# Patient Record
Sex: FEMALE | Race: WHITE | NOT HISPANIC OR LATINO | Employment: FULL TIME | URBAN - METROPOLITAN AREA
[De-identification: names, ages, dates, MRNs, and addresses within clinical notes are randomized per-mention and may not be internally consistent; named-entity substitution may affect disease eponyms.]

---

## 2023-05-19 DIAGNOSIS — F41.9 ANXIETY AND DEPRESSION: ICD-10-CM

## 2023-05-19 DIAGNOSIS — F41.1 ANXIETY, GENERALIZED: ICD-10-CM

## 2023-05-19 DIAGNOSIS — F32.A ANXIETY AND DEPRESSION: ICD-10-CM

## 2023-05-19 PROBLEM — N32.81 OAB (OVERACTIVE BLADDER): Status: ACTIVE | Noted: 2023-05-19

## 2023-05-19 PROBLEM — Z15.09 BRCA2 POSITIVE: Status: ACTIVE | Noted: 2023-05-19

## 2023-05-19 PROBLEM — R29.818 SUSPECTED SLEEP APNEA: Status: ACTIVE | Noted: 2023-05-19

## 2023-05-19 PROBLEM — K29.70 GASTRITIS: Status: ACTIVE | Noted: 2023-05-19

## 2023-05-19 PROBLEM — E88.810 DYSMETABOLIC SYNDROME X: Status: ACTIVE | Noted: 2023-05-19

## 2023-05-19 PROBLEM — E66.9 OBESITY: Status: ACTIVE | Noted: 2023-05-19

## 2023-05-19 PROBLEM — Z98.84 BARIATRIC SURGERY STATUS: Status: ACTIVE | Noted: 2023-05-19

## 2023-05-19 PROBLEM — G43.909 MIGRAINE HEADACHE: Status: ACTIVE | Noted: 2023-05-19

## 2023-05-19 PROBLEM — Z15.01 BRCA2 POSITIVE: Status: ACTIVE | Noted: 2023-05-19

## 2023-05-19 PROBLEM — G47.33 OBSTRUCTIVE SLEEP APNEA: Status: ACTIVE | Noted: 2023-05-19

## 2023-05-19 PROBLEM — R53.83 FATIGUE: Status: ACTIVE | Noted: 2023-05-19

## 2023-05-19 PROBLEM — R06.83 SNORING: Status: ACTIVE | Noted: 2023-05-19

## 2023-05-19 PROBLEM — R73.03 PREDIABETES: Status: ACTIVE | Noted: 2023-05-19

## 2023-05-19 PROBLEM — N92.1 MENORRHAGIA WITH IRREGULAR CYCLE: Status: ACTIVE | Noted: 2023-05-19

## 2023-05-19 PROBLEM — Z90.49 S/P CHOLECYSTECTOMY: Status: ACTIVE | Noted: 2023-05-19

## 2023-05-19 PROBLEM — R40.0 DAYTIME SLEEPINESS: Status: ACTIVE | Noted: 2023-05-19

## 2023-05-19 PROBLEM — N39.41 URGE INCONTINENCE OF URINE: Status: ACTIVE | Noted: 2023-05-19

## 2023-05-19 PROBLEM — K91.2 MALNUTRITION FOLLOWING GASTROINTESTINAL SURGERY (HHS-HCC): Status: ACTIVE | Noted: 2023-05-19

## 2023-05-19 PROBLEM — I10 BENIGN HYPERTENSION: Status: ACTIVE | Noted: 2023-05-19

## 2023-05-19 RX ORDER — VENLAFAXINE HYDROCHLORIDE 150 MG/1
150 CAPSULE, EXTENDED RELEASE ORAL DAILY
Qty: 30 CAPSULE | Refills: 0 | Status: SHIPPED | OUTPATIENT
Start: 2023-05-19 | End: 2023-06-23

## 2023-05-19 RX ORDER — VENLAFAXINE HYDROCHLORIDE 150 MG/1
1 CAPSULE, EXTENDED RELEASE ORAL DAILY
COMMUNITY
Start: 2014-05-21 | End: 2023-05-19 | Stop reason: SDUPTHER

## 2023-05-19 RX ORDER — BUSPIRONE HYDROCHLORIDE 10 MG/1
10 TABLET ORAL 3 TIMES DAILY
COMMUNITY
Start: 2023-02-01 | End: 2023-07-24 | Stop reason: SDUPTHER

## 2023-06-15 DIAGNOSIS — F41.1 ANXIETY, GENERALIZED: ICD-10-CM

## 2023-06-22 NOTE — TELEPHONE ENCOUNTER
Patient needs refill of Venlafaxine 150 mg called into [facility] on [facility].  Patient is completely out.  Last OV 11/18/2022, next OV 7/28/2023.

## 2023-06-23 RX ORDER — VENLAFAXINE HYDROCHLORIDE 150 MG/1
150 CAPSULE, EXTENDED RELEASE ORAL DAILY
Qty: 30 CAPSULE | Refills: 0 | Status: SHIPPED | OUTPATIENT
Start: 2023-06-23 | End: 2023-07-24 | Stop reason: SDUPTHER

## 2023-07-24 ENCOUNTER — OFFICE VISIT (OUTPATIENT)
Dept: PRIMARY CARE | Facility: CLINIC | Age: 48
End: 2023-07-24
Payer: COMMERCIAL

## 2023-07-24 VITALS
SYSTOLIC BLOOD PRESSURE: 120 MMHG | HEIGHT: 68 IN | DIASTOLIC BLOOD PRESSURE: 80 MMHG | BODY MASS INDEX: 35.01 KG/M2 | OXYGEN SATURATION: 97 % | WEIGHT: 231 LBS | HEART RATE: 78 BPM

## 2023-07-24 DIAGNOSIS — F41.9 ANXIETY AND DEPRESSION: ICD-10-CM

## 2023-07-24 DIAGNOSIS — K91.2 MALNUTRITION FOLLOWING GASTROINTESTINAL SURGERY (HHS-HCC): ICD-10-CM

## 2023-07-24 DIAGNOSIS — F32.A ANXIETY AND DEPRESSION: ICD-10-CM

## 2023-07-24 DIAGNOSIS — Z00.00 ANNUAL PHYSICAL EXAM: Primary | ICD-10-CM

## 2023-07-24 DIAGNOSIS — E55.9 VITAMIN D DEFICIENCY: ICD-10-CM

## 2023-07-24 DIAGNOSIS — Z12.11 COLON CANCER SCREENING: ICD-10-CM

## 2023-07-24 DIAGNOSIS — E66.09 CLASS 2 OBESITY DUE TO EXCESS CALORIES WITH BODY MASS INDEX (BMI) OF 35.0 TO 35.9 IN ADULT, UNSPECIFIED WHETHER SERIOUS COMORBIDITY PRESENT: ICD-10-CM

## 2023-07-24 DIAGNOSIS — F41.1 ANXIETY, GENERALIZED: ICD-10-CM

## 2023-07-24 DIAGNOSIS — R73.03 PREDIABETES: ICD-10-CM

## 2023-07-24 PROCEDURE — 3008F BODY MASS INDEX DOCD: CPT | Performed by: FAMILY MEDICINE

## 2023-07-24 PROCEDURE — 1036F TOBACCO NON-USER: CPT | Performed by: FAMILY MEDICINE

## 2023-07-24 PROCEDURE — 3079F DIAST BP 80-89 MM HG: CPT | Performed by: FAMILY MEDICINE

## 2023-07-24 PROCEDURE — 99396 PREV VISIT EST AGE 40-64: CPT | Performed by: FAMILY MEDICINE

## 2023-07-24 PROCEDURE — 3074F SYST BP LT 130 MM HG: CPT | Performed by: FAMILY MEDICINE

## 2023-07-24 RX ORDER — BUSPIRONE HYDROCHLORIDE 10 MG/1
10 TABLET ORAL 3 TIMES DAILY
Qty: 90 TABLET | Refills: 11 | Status: SHIPPED | OUTPATIENT
Start: 2023-07-24 | End: 2024-07-23

## 2023-07-24 RX ORDER — VENLAFAXINE HYDROCHLORIDE 150 MG/1
150 CAPSULE, EXTENDED RELEASE ORAL DAILY
Qty: 90 CAPSULE | Refills: 3 | Status: SHIPPED | OUTPATIENT
Start: 2023-07-24

## 2023-07-24 ASSESSMENT — PATIENT HEALTH QUESTIONNAIRE - PHQ9
SUM OF ALL RESPONSES TO PHQ9 QUESTIONS 1 AND 2: 0
9. THOUGHTS THAT YOU WOULD BE BETTER OFF DEAD, OR OF HURTING YOURSELF: NOT AT ALL
10. IF YOU CHECKED OFF ANY PROBLEMS, HOW DIFFICULT HAVE THESE PROBLEMS MADE IT FOR YOU TO DO YOUR WORK, TAKE CARE OF THINGS AT HOME, OR GET ALONG WITH OTHER PEOPLE: NOT DIFFICULT AT ALL
1. LITTLE INTEREST OR PLEASURE IN DOING THINGS: NOT AT ALL
8. MOVING OR SPEAKING SO SLOWLY THAT OTHER PEOPLE COULD HAVE NOTICED. OR THE OPPOSITE, BEING SO FIGETY OR RESTLESS THAT YOU HAVE BEEN MOVING AROUND A LOT MORE THAN USUAL: NOT AT ALL
4. FEELING TIRED OR HAVING LITTLE ENERGY: MORE THAN HALF THE DAYS
7. TROUBLE CONCENTRATING ON THINGS, SUCH AS READING THE NEWSPAPER OR WATCHING TELEVISION: NOT AT ALL
5. POOR APPETITE OR OVEREATING: MORE THAN HALF THE DAYS
6. FEELING BAD ABOUT YOURSELF - OR THAT YOU ARE A FAILURE OR HAVE LET YOURSELF OR YOUR FAMILY DOWN: MORE THAN HALF THE DAYS
3. TROUBLE FALLING OR STAYING ASLEEP OR SLEEPING TOO MUCH: SEVERAL DAYS
SUM OF ALL RESPONSES TO PHQ QUESTIONS 1-9: 7
2. FEELING DOWN, DEPRESSED OR HOPELESS: NOT AT ALL

## 2023-07-24 ASSESSMENT — ANXIETY QUESTIONNAIRES
7. FEELING AFRAID AS IF SOMETHING AWFUL MIGHT HAPPEN: SEVERAL DAYS
1. FEELING NERVOUS, ANXIOUS, OR ON EDGE: SEVERAL DAYS
2. NOT BEING ABLE TO STOP OR CONTROL WORRYING: NOT AT ALL
IF YOU CHECKED OFF ANY PROBLEMS ON THIS QUESTIONNAIRE, HOW DIFFICULT HAVE THESE PROBLEMS MADE IT FOR YOU TO DO YOUR WORK, TAKE CARE OF THINGS AT HOME, OR GET ALONG WITH OTHER PEOPLE: NOT DIFFICULT AT ALL
4. TROUBLE RELAXING: NOT AT ALL
6. BECOMING EASILY ANNOYED OR IRRITABLE: NOT AT ALL
3. WORRYING TOO MUCH ABOUT DIFFERENT THINGS: NOT AT ALL

## 2023-07-24 ASSESSMENT — ENCOUNTER SYMPTOMS
OCCASIONAL FEELINGS OF UNSTEADINESS: 0
DEPRESSION: 0
LOSS OF SENSATION IN FEET: 0

## 2023-07-24 NOTE — PROGRESS NOTES
"Subjective   Mercedes Burnham is a 48 y.o. female and is here for a comprehensive physical exam and follow up on the following concerns:    ANXIETY & DEPRESSION: moods stable on Venlafaxine    OBESITY: s/p bariatric procedure  Gradually has regained weight over the last few years    Intermittent low glucose - feeling shaky and then check s glucose and levels can be down to 60-70  No triggers that she can identify  Will brennon to drink protein shake or elli milk which does help usually    GEMA: stopped CPAP and feeling fairly well rested    Do you take any herbs or supplements that were not prescribed by a doctor? no  Are you taking calcium supplements? no  Are you taking aspirin daily? no    SOC Hx:   Tobacco Use: Low Risk  (2023)    Patient History     Smoking Tobacco Use: Never     Smokeless Tobacco Use: Never     Passive Exposure: Not on file     Alcohol Use: Not on file     DIET: regular, well rounded - less protein than what she considers normal  Vitamin B complex and Vit B 12    EXERCISE: nothing regularly    ELIMINATION: no constipation or diarrhea    No urinary issues    SLEEP: no issues for the most part - still somewhat fatigued during the day  Rare difficulty falling asleep    MOODS: moods manageable - no concerning new symptoms  PHQ-9: Patient Health Questionnaire-9 Score: 0    MICHELLE-7: MICHELLE-7 Total Score: 0     OB/GYN:   No known family history of breast cancer.  Menstrual cycles - stopped after endometrial  ablation 2 years ago      History:  LMP: No LMP recorded.  MAMMO:  2022  Last pap date: utd  Abnormal pap? no  : 1  Para: 1    Review of Systems   Review of Systems negative except as noted in HPI and Chief complaint.     Objective     /80 (BP Location: Left arm, Patient Position: Sitting, BP Cuff Size: Adult)   Pulse 78   Ht 1.715 m (5' 7.5\")   Wt 105 kg (231 lb)   SpO2 97%   BMI 35.65 kg/m²      Physical Exam  Constitutional:       General: She is not in acute distress.   "   Appearance: Normal appearance.   HENT:      Head: Normocephalic and atraumatic.      Right Ear: Tympanic membrane and ear canal normal.      Left Ear: Tympanic membrane and ear canal normal.      Mouth/Throat:      Mouth: Mucous membranes are moist.   Eyes:      Conjunctiva/sclera: Conjunctivae normal.      Pupils: Pupils are equal, round, and reactive to light.   Neck:      Vascular: No carotid bruit.   Cardiovascular:      Rate and Rhythm: Normal rate and regular rhythm.      Heart sounds: No murmur heard.  Pulmonary:      Effort: Pulmonary effort is normal.      Breath sounds: Normal breath sounds. No wheezing or rhonchi.   Abdominal:      General: Bowel sounds are normal.      Palpations: Abdomen is soft.   Musculoskeletal:         General: No swelling.      Cervical back: No rigidity.   Lymphadenopathy:      Cervical: No cervical adenopathy.   Skin:     General: Skin is warm and dry.      Findings: No rash.   Neurological:      Mental Status: She is alert.         Assessment/Plan   Healthy female exam.      1. Please have labs drawn at your earliest convenience.  You should fast 12 hours prior to arriving at the lab - during these 12 hours you may have water, black coffee, black tea - nothing with cream or sugar and no solid foods.    Our office will contact you with results after they have been reviewed.  Please allow 48 - 72 hours for most results, some may take longer.  Please keep in mind that results may post immediately to your online portal, even before we have a chance to review them.  Once we have had the opportunity to review we will post comments and have our staff contact you with results and any instructions.  Please call our office if you do not hear from our office in 7 days.     2. Patient Counseling:  --Nutrition: Stressed importance of moderation in sodium/caffeine intake, saturated fat and cholesterol, caloric balance, sufficient intake of fresh fruits, vegetables, fiber, calcium, iron, and  1 mg of folate supplement per day (for females capable of pregnancy).  --Discussed the issue of estrogen replacement, calcium supplement, and the daily use of baby aspirin.  --Exercise: Stressed the importance of regular exercise.   --Substance Abuse: Discussed cessation/primary prevention of tobacco, alcohol, or other drug use; driving or other dangerous activities under the influence; availability of treatment for abuse.    --Sexuality: Discussed sexually transmitted diseases, partner selection, use of condoms, avoidance of unintended pregnancy  and contraceptive alternatives.   --Injury prevention: Discussed safety belts, safety helmets, smoke detector, smoking near bedding or upholstery.   --Dental health: Discussed importance of regular tooth brushing, flossing, and dental visits.  --Immunizations reviewed.  --Discussed benefits of screening colonoscopy.  --After hours service discussed with patient  3. Discussed the patient's BMI with her.  The BMI is above average. The patient received Provided instructions on dietary changes  Provided instructions on exercise  Advised to Increase physical activity because they have an above normal BMI.    Problem List Items Addressed This Visit       Anxiety and depression    Relevant Medications    venlafaxine XR (Effexor-XR) 150 mg 24 hr capsule    busPIRone (Buspar) 10 mg tablet    Anxiety, generalized    Relevant Medications    venlafaxine XR (Effexor-XR) 150 mg 24 hr capsule    busPIRone (Buspar) 10 mg tablet    Malnutrition following gastrointestinal surgery    Obesity    Relevant Orders    Referral to Population Health Services    Prediabetes    Relevant Orders    Hemoglobin A1C     Other Visit Diagnoses       Annual physical exam    -  Primary    Relevant Orders    CBC    Comprehensive Metabolic Panel    Lipid Panel    TSH with reflex to Free T4 if abnormal    Vitamin D deficiency        Relevant Orders    Vitamin D, Total    Colon cancer screening        Relevant  Orders    Cologuard® colon cancer screening             Follow up  1 year for CPE - sooner

## 2023-07-24 NOTE — PATIENT INSTRUCTIONS
There are many weight loss medication options as listed below:  Oral forms: Contrave, Qsymia  Injectable forms: Saxenda, Wegovy, Mounjaro  Please check with your insurance company regarding coverage - please confirm what diagnosis these are covered under as some insurance companies will not cover these medications unless you have a diagnosis of Diabetes or Prediabetes.    Once you confirm coverage please call the office to arrange a virtual appointment to discuss your weight loss journey and which options we feel are most appropriative for you.

## 2023-07-26 ASSESSMENT — PATIENT HEALTH QUESTIONNAIRE - PHQ9
SUM OF ALL RESPONSES TO PHQ QUESTIONS 1-9: 0
9. THOUGHTS THAT YOU WOULD BE BETTER OFF DEAD, OR OF HURTING YOURSELF: NOT AT ALL
8. MOVING OR SPEAKING SO SLOWLY THAT OTHER PEOPLE COULD HAVE NOTICED. OR THE OPPOSITE, BEING SO FIGETY OR RESTLESS THAT YOU HAVE BEEN MOVING AROUND A LOT MORE THAN USUAL: NOT AT ALL
3. TROUBLE FALLING OR STAYING ASLEEP OR SLEEPING TOO MUCH: NOT AT ALL
5. POOR APPETITE OR OVEREATING: NOT AT ALL
6. FEELING BAD ABOUT YOURSELF - OR THAT YOU ARE A FAILURE OR HAVE LET YOURSELF OR YOUR FAMILY DOWN: NOT AT ALL
SUM OF ALL RESPONSES TO PHQ9 QUESTIONS 1 AND 2: 0
10. IF YOU CHECKED OFF ANY PROBLEMS, HOW DIFFICULT HAVE THESE PROBLEMS MADE IT FOR YOU TO DO YOUR WORK, TAKE CARE OF THINGS AT HOME, OR GET ALONG WITH OTHER PEOPLE: NOT DIFFICULT AT ALL
4. FEELING TIRED OR HAVING LITTLE ENERGY: NOT AT ALL
7. TROUBLE CONCENTRATING ON THINGS, SUCH AS READING THE NEWSPAPER OR WATCHING TELEVISION: NOT AT ALL
1. LITTLE INTEREST OR PLEASURE IN DOING THINGS: NOT AT ALL
2. FEELING DOWN, DEPRESSED OR HOPELESS: NOT AT ALL

## 2023-07-26 ASSESSMENT — ANXIETY QUESTIONNAIRES
IF YOU CHECKED OFF ANY PROBLEMS ON THIS QUESTIONNAIRE, HOW DIFFICULT HAVE THESE PROBLEMS MADE IT FOR YOU TO DO YOUR WORK, TAKE CARE OF THINGS AT HOME, OR GET ALONG WITH OTHER PEOPLE: NOT DIFFICULT AT ALL
3. WORRYING TOO MUCH ABOUT DIFFERENT THINGS: NOT AT ALL
5. BEING SO RESTLESS THAT IT IS HARD TO SIT STILL: NOT AT ALL
1. FEELING NERVOUS, ANXIOUS, OR ON EDGE: NOT AT ALL
2. NOT BEING ABLE TO STOP OR CONTROL WORRYING: NOT AT ALL
6. BECOMING EASILY ANNOYED OR IRRITABLE: NOT AT ALL
GAD7 TOTAL SCORE: 0
7. FEELING AFRAID AS IF SOMETHING AWFUL MIGHT HAPPEN: NOT AT ALL
4. TROUBLE RELAXING: NOT AT ALL

## 2023-07-28 ENCOUNTER — APPOINTMENT (OUTPATIENT)
Dept: PRIMARY CARE | Facility: CLINIC | Age: 48
End: 2023-07-28
Payer: COMMERCIAL

## 2023-08-01 ENCOUNTER — PATIENT OUTREACH (OUTPATIENT)
Dept: CARE COORDINATION | Facility: CLINIC | Age: 48
End: 2023-08-01
Payer: COMMERCIAL

## 2023-08-01 NOTE — PROGRESS NOTES
Pt is a new referral to this RD by PCP for wt gain s/p bariatric sx. Spoke to pt today, pt agreed to work with this RD for the above. Initial outreach appt scheduled for 8/7

## 2023-08-02 ENCOUNTER — LAB (OUTPATIENT)
Dept: LAB | Facility: LAB | Age: 48
End: 2023-08-02
Payer: COMMERCIAL

## 2023-08-02 DIAGNOSIS — Z00.00 ANNUAL PHYSICAL EXAM: ICD-10-CM

## 2023-08-02 DIAGNOSIS — R73.03 PREDIABETES: ICD-10-CM

## 2023-08-02 DIAGNOSIS — E55.9 VITAMIN D DEFICIENCY: ICD-10-CM

## 2023-08-02 LAB
ALANINE AMINOTRANSFERASE (SGPT) (U/L) IN SER/PLAS: 29 U/L (ref 7–45)
ALBUMIN (G/DL) IN SER/PLAS: 4.1 G/DL (ref 3.4–5)
ALKALINE PHOSPHATASE (U/L) IN SER/PLAS: 63 U/L (ref 33–110)
ANION GAP IN SER/PLAS: 13 MMOL/L (ref 10–20)
ASPARTATE AMINOTRANSFERASE (SGOT) (U/L) IN SER/PLAS: 33 U/L (ref 9–39)
BILIRUBIN TOTAL (MG/DL) IN SER/PLAS: 0.6 MG/DL (ref 0–1.2)
CALCIDIOL (25 OH VITAMIN D3) (NG/ML) IN SER/PLAS: 38 NG/ML
CALCIUM (MG/DL) IN SER/PLAS: 9.1 MG/DL (ref 8.6–10.6)
CARBON DIOXIDE, TOTAL (MMOL/L) IN SER/PLAS: 26 MMOL/L (ref 21–32)
CHLORIDE (MMOL/L) IN SER/PLAS: 107 MMOL/L (ref 98–107)
CHOLESTEROL (MG/DL) IN SER/PLAS: 154 MG/DL (ref 0–199)
CHOLESTEROL IN HDL (MG/DL) IN SER/PLAS: 87.3 MG/DL
CHOLESTEROL/HDL RATIO: 1.8
CREATININE (MG/DL) IN SER/PLAS: 0.74 MG/DL (ref 0.5–1.05)
ERYTHROCYTE DISTRIBUTION WIDTH (RATIO) BY AUTOMATED COUNT: 12.8 % (ref 11.5–14.5)
ERYTHROCYTE MEAN CORPUSCULAR HEMOGLOBIN CONCENTRATION (G/DL) BY AUTOMATED: 31.7 G/DL (ref 32–36)
ERYTHROCYTE MEAN CORPUSCULAR VOLUME (FL) BY AUTOMATED COUNT: 99 FL (ref 80–100)
ERYTHROCYTES (10*6/UL) IN BLOOD BY AUTOMATED COUNT: 3.85 X10E12/L (ref 4–5.2)
ESTIMATED AVERAGE GLUCOSE FOR HBA1C: 103 MG/DL
GFR FEMALE: >90 ML/MIN/1.73M2
GLUCOSE (MG/DL) IN SER/PLAS: 89 MG/DL (ref 74–99)
HEMATOCRIT (%) IN BLOOD BY AUTOMATED COUNT: 38.2 % (ref 36–46)
HEMOGLOBIN (G/DL) IN BLOOD: 12.1 G/DL (ref 12–16)
HEMOGLOBIN A1C/HEMOGLOBIN TOTAL IN BLOOD: 5.2 %
LDL: 58 MG/DL (ref 0–99)
LEUKOCYTES (10*3/UL) IN BLOOD BY AUTOMATED COUNT: 4.1 X10E9/L (ref 4.4–11.3)
NRBC (PER 100 WBCS) BY AUTOMATED COUNT: 0 /100 WBC (ref 0–0)
PLATELETS (10*3/UL) IN BLOOD AUTOMATED COUNT: 255 X10E9/L (ref 150–450)
POTASSIUM (MMOL/L) IN SER/PLAS: 4.5 MMOL/L (ref 3.5–5.3)
PROTEIN TOTAL: 6.5 G/DL (ref 6.4–8.2)
SODIUM (MMOL/L) IN SER/PLAS: 141 MMOL/L (ref 136–145)
THYROTROPIN (MIU/L) IN SER/PLAS BY DETECTION LIMIT <= 0.05 MIU/L: 1.62 MIU/L (ref 0.44–3.98)
TRIGLYCERIDE (MG/DL) IN SER/PLAS: 44 MG/DL (ref 0–149)
UREA NITROGEN (MG/DL) IN SER/PLAS: 13 MG/DL (ref 6–23)
VLDL: 9 MG/DL (ref 0–40)

## 2023-08-02 PROCEDURE — 80053 COMPREHEN METABOLIC PANEL: CPT

## 2023-08-02 PROCEDURE — 82306 VITAMIN D 25 HYDROXY: CPT

## 2023-08-02 PROCEDURE — 85027 COMPLETE CBC AUTOMATED: CPT

## 2023-08-02 PROCEDURE — 84443 ASSAY THYROID STIM HORMONE: CPT

## 2023-08-02 PROCEDURE — 83036 HEMOGLOBIN GLYCOSYLATED A1C: CPT

## 2023-08-02 PROCEDURE — 80061 LIPID PANEL: CPT

## 2023-08-02 PROCEDURE — 36415 COLL VENOUS BLD VENIPUNCTURE: CPT

## 2023-08-07 ENCOUNTER — PATIENT OUTREACH (OUTPATIENT)
Dept: CARE COORDINATION | Facility: CLINIC | Age: 48
End: 2023-08-07
Payer: COMMERCIAL

## 2023-08-07 NOTE — PROGRESS NOTES
New ref from PCP for desired wt loss. Hx) HTN, dysmetabolic syndrome, preDM, bariatric sx (6/2019), gastritis, malnutrition s/p GI sx, cholecystectomy, MICHELLE, migraine, GEMA. Labs) A1C 5.2 (WNL), vit D 38, chol 154, HDL 87.3, LDL 58, TG 44, CMP WNL,   Diet Hx) #. At time of bariatric sx she was ~325#, highest wt was ~345#. Lowest wt after sx was 209# which was ~ 9/2020. States she didn't have a wt loss goal when she got sx “just to go as far as I could” but is upset that she is no longer down 100# from her pre-sx wt. Goal at this time is <200#.   Had virtual appt with pt today. Lives with BF and her son, is the primary cook in the house. Wishes to lose wt and get < 200# as she felt better when she was ~209# feels “uncomfortable” at her current wt. Does recognize that people have different set points when it comes to their wt “everyone is different” based on genetics. Feels sx already “greatly improved her overall wellbeing and health”. Is c/o more cravings now than when she was at her lowest wt. Does appear to attach some level of morality to food “I shouldn't be eating those things”, “should be eating better foods”. We reviewed what foods she feels are less healthy or that she feels she shouldn't have- “snackie things” which she tends to eat when she gets home from work and after dinner. This RD edu pt on need to repair her relationship with food and body in order for her to be able to control her cravings and detach morality with what she eats. Set the below goals as a start.   Goals) Check in with hunger/fullness cues (“am I hungry”) during afternoon and even snack times- if hungry eat, if not don't. Mindfully eat snacks. Honor cravings with hungry.

## 2023-08-28 ENCOUNTER — DOCUMENTATION (OUTPATIENT)
Dept: CARE COORDINATION | Facility: CLINIC | Age: 48
End: 2023-08-28
Payer: COMMERCIAL

## 2023-08-28 NOTE — PROGRESS NOTES
Attempted to call pt mult times to reschedule after missed appt. LVMs with this RD's info if pt wishes to return call.

## 2023-09-12 LAB — NONINV COLON CA DNA+OCC BLD SCRN STL QL: NEGATIVE

## 2024-03-22 ENCOUNTER — TELEPHONE (OUTPATIENT)
Dept: ORTHOPEDIC SURGERY | Facility: CLINIC | Age: 49
End: 2024-03-22

## 2024-03-22 ENCOUNTER — HOSPITAL ENCOUNTER (OUTPATIENT)
Dept: RADIOLOGY | Facility: HOSPITAL | Age: 49
Discharge: HOME | End: 2024-03-22
Payer: COMMERCIAL

## 2024-03-22 ENCOUNTER — OFFICE VISIT (OUTPATIENT)
Dept: ORTHOPEDIC SURGERY | Facility: HOSPITAL | Age: 49
End: 2024-03-22
Payer: COMMERCIAL

## 2024-03-22 VITALS — BODY MASS INDEX: 34.1 KG/M2 | HEIGHT: 68 IN | WEIGHT: 225 LBS

## 2024-03-22 DIAGNOSIS — G89.29 CHRONIC PAIN OF LEFT KNEE: ICD-10-CM

## 2024-03-22 DIAGNOSIS — M17.12 OSTEOARTHRITIS OF LEFT KNEE, UNSPECIFIED OSTEOARTHRITIS TYPE: Primary | ICD-10-CM

## 2024-03-22 DIAGNOSIS — M25.562 CHRONIC PAIN OF LEFT KNEE: ICD-10-CM

## 2024-03-22 DIAGNOSIS — M17.12 PRIMARY OSTEOARTHRITIS OF LEFT KNEE: Primary | ICD-10-CM

## 2024-03-22 PROCEDURE — 99203 OFFICE O/P NEW LOW 30 MIN: CPT | Performed by: PHYSICIAN ASSISTANT

## 2024-03-22 PROCEDURE — 73564 X-RAY EXAM KNEE 4 OR MORE: CPT | Mod: LT

## 2024-03-22 PROCEDURE — 1036F TOBACCO NON-USER: CPT | Performed by: PHYSICIAN ASSISTANT

## 2024-03-22 PROCEDURE — 73564 X-RAY EXAM KNEE 4 OR MORE: CPT | Mod: LEFT SIDE | Performed by: RADIOLOGY

## 2024-03-22 PROCEDURE — 3008F BODY MASS INDEX DOCD: CPT | Performed by: PHYSICIAN ASSISTANT

## 2024-03-22 PROCEDURE — 99213 OFFICE O/P EST LOW 20 MIN: CPT | Performed by: PHYSICIAN ASSISTANT

## 2024-03-22 RX ORDER — KETOROLAC TROMETHAMINE 10 MG/1
10 TABLET, FILM COATED ORAL EVERY 6 HOURS PRN
Qty: 15 TABLET | Refills: 0 | Status: SHIPPED | OUTPATIENT
Start: 2024-03-22 | End: 2024-03-22 | Stop reason: ALTCHOICE

## 2024-03-22 RX ORDER — MELOXICAM 7.5 MG/1
7.5 TABLET ORAL DAILY
Qty: 90 TABLET | Refills: 1 | Status: SHIPPED | OUTPATIENT
Start: 2024-03-22

## 2024-03-22 RX ORDER — METHYLPREDNISOLONE 4 MG/1
TABLET ORAL
Qty: 21 TABLET | Refills: 0 | Status: SHIPPED | OUTPATIENT
Start: 2024-03-22

## 2024-03-22 ASSESSMENT — PAIN - FUNCTIONAL ASSESSMENT: PAIN_FUNCTIONAL_ASSESSMENT: 0-10

## 2024-03-22 ASSESSMENT — PAIN DESCRIPTION - DESCRIPTORS: DESCRIPTORS: ACHING;STABBING

## 2024-03-22 ASSESSMENT — PAIN SCALES - GENERAL: PAINLEVEL_OUTOF10: 2

## 2024-03-22 NOTE — PROGRESS NOTES
"HPI:  Mercedes Burnham is a 48 y.o. female who presents to the orthopedic injury cliinic for evaluation of left knee pain x 3 days.     Pain started gradually without injury or inciting event. She has noted swelling of the right knee and pain with knee ROM. Pain anterior knee and worse with weight bearing/ambulation.     She reports having her knee \"drained\" in the past over 5 years ago.     She has been icing/elevating and using ibuprofen for pain.     ROS:  Reviewed on EMR and patient intake sheet.    PMH/SH:  Reviewed on EMR and patient intake sheet.    Exam:  Knee Musculoskeletal Exam  Gait    Antalgic: left    Inspection    Leg length disparity: no discrepancy    Right      Right knee inspection is normal.      Left      Erythema: none        Effusion: mild        Edema: none        Ecchymosis: none        Deformity: none        Alignment: normal        Previous incision: no previous incision    Palpation    Left      Increased warmth: none        Masses: none        Crepitus: none        Tenderness: present        Tenderness comment: diffuse tenderness left knee    Range of Motion    Right      Right knee range of motion is normal.      Left      Active extension: 0 (pain with extension)      Passive extension: 0      Active flexion: 130 (pain with flexion)      Passive flexion: 130    Strength    Left      Left knee strength is normal.      Instability    Left      Instability signs: none - stable    Neurovascular    Left      Left knee neurovascular exam is normal.      Special Signs    Left      Left knee special signs are normal.    General      Constitutional: mildly obese    Psychiatric: normal mood and affect and no acute distress    Neurological: alert and oriented x3        Radiology:     Xrays left knee done in the office today 03/22/24 personally reviewed. Advanced tricompartmental osteoarthritis, worst medial compartment, progressed from imaging performed 2016.       Assessment and Plan:  1. " Chronic pain of left knee  - XR knee left 4+ views; Future    2. Osteoarthritis of left knee, unspecified osteoarthritis type  - Referral to Physical Therapy; Future    I reviewed the xrays in detail with Mercedes today. Patient was given a script for Toradol x 5 days and referral to outpatient physical therapy.     Advised to continue with rest/ice/elevation until swelling improves.     Follow up appointment with one of our orthopedic knee colleagues was provided today.    Patient in agreement to plan of care. Of course Mercedes Burnham is welcome to call at anytime with questions or concerns.     Mercedes Stewart PA-C  Department of Orthopaedic Surgery    [address] [place] [address]    Voicemail: [phone]   Appts: [phone]  Fax: [phone]

## 2024-03-26 ENCOUNTER — DOCUMENTATION (OUTPATIENT)
Dept: ORTHOPEDIC SURGERY | Facility: CLINIC | Age: 49
End: 2024-03-26
Payer: COMMERCIAL

## 2024-03-26 NOTE — TELEPHONE ENCOUNTER
Discontinued toradol and changed to medrol pack and meloxicam per pharmacy recommendation. Patient notified

## 2024-05-02 ENCOUNTER — OFFICE VISIT (OUTPATIENT)
Dept: ORTHOPEDIC SURGERY | Facility: HOSPITAL | Age: 49
End: 2024-05-02
Payer: COMMERCIAL

## 2024-05-02 VITALS — BODY MASS INDEX: 34.86 KG/M2 | HEIGHT: 68 IN | WEIGHT: 230 LBS

## 2024-05-02 DIAGNOSIS — M17.0 ARTHRITIS OF BOTH KNEES: Primary | ICD-10-CM

## 2024-05-02 PROCEDURE — 3008F BODY MASS INDEX DOCD: CPT | Performed by: ORTHOPAEDIC SURGERY

## 2024-05-02 PROCEDURE — 1036F TOBACCO NON-USER: CPT | Performed by: ORTHOPAEDIC SURGERY

## 2024-05-02 PROCEDURE — 99213 OFFICE O/P EST LOW 20 MIN: CPT | Performed by: ORTHOPAEDIC SURGERY

## 2024-05-02 PROCEDURE — 99203 OFFICE O/P NEW LOW 30 MIN: CPT | Performed by: ORTHOPAEDIC SURGERY

## 2024-05-02 ASSESSMENT — PAIN - FUNCTIONAL ASSESSMENT: PAIN_FUNCTIONAL_ASSESSMENT: 0-10

## 2024-05-02 ASSESSMENT — PAIN SCALES - GENERAL: PAINLEVEL_OUTOF10: 0 - NO PAIN

## 2024-05-03 NOTE — PROGRESS NOTES
49-year-old is seen with left knee pain.  She has been having persistent severe sharp shooting pain in the left knee.  Pain is worse with standing and walking going up and down stairs and getting up and down from a chair in and out of a car.  She works at [employer] and is on her feet a lot.  Has been in the emergency room and was prescribed Toradol and oral steroids that helped her.  She has not yet gone to physical therapy.    Pleasant and no acute distress. Walks with antalgic gait. Stands with varus alignment of the left knee and neutral on the right. Left knee range of motion is 5-110°. The knee is stable to varus and valgus stress Lachman and posterior drawer. There is a mild effusion. There is generalized tenderness. Right knee range of motion is 0-120°. There is no effusion. The knee is stable to varus and valgus stress Lachman and posterior drawer. Both lower extremities are well perfused the skin is intact and muscle tone is adequate.    Multiple x-ray views of the left knee are personally reviewed and there is degenerative changes with medial compartment narrowing and osteophyte formation subchondral sclerosis and cystic change.    A detailed discussion about knee arthritis was done.  Treatment options were reviewed.  She will perform physical therapy.  She can use Tylenol or an NSAID as needed.  Cortisone injection could also be considered if her symptoms persist.  She may be a candidate for viscosupplementation also

## 2024-07-20 ENCOUNTER — LAB (OUTPATIENT)
Dept: LAB | Facility: LAB | Age: 49
End: 2024-07-20
Payer: COMMERCIAL

## 2024-07-20 ENCOUNTER — APPOINTMENT (OUTPATIENT)
Dept: PRIMARY CARE | Facility: CLINIC | Age: 49
End: 2024-07-20
Payer: COMMERCIAL

## 2024-07-20 VITALS
BODY MASS INDEX: 35.61 KG/M2 | SYSTOLIC BLOOD PRESSURE: 120 MMHG | HEART RATE: 75 BPM | OXYGEN SATURATION: 99 % | HEIGHT: 68 IN | DIASTOLIC BLOOD PRESSURE: 80 MMHG | WEIGHT: 235 LBS

## 2024-07-20 DIAGNOSIS — R73.03 PREDIABETES: ICD-10-CM

## 2024-07-20 DIAGNOSIS — K91.2 MALNUTRITION FOLLOWING GASTROINTESTINAL SURGERY (HHS-HCC): ICD-10-CM

## 2024-07-20 DIAGNOSIS — F32.A ANXIETY AND DEPRESSION: ICD-10-CM

## 2024-07-20 DIAGNOSIS — Z00.00 ANNUAL PHYSICAL EXAM: ICD-10-CM

## 2024-07-20 DIAGNOSIS — F41.9 ANXIETY AND DEPRESSION: ICD-10-CM

## 2024-07-20 DIAGNOSIS — F41.1 ANXIETY, GENERALIZED: ICD-10-CM

## 2024-07-20 DIAGNOSIS — E66.01 CLASS 2 SEVERE OBESITY DUE TO EXCESS CALORIES WITH SERIOUS COMORBIDITY AND BODY MASS INDEX (BMI) OF 36.0 TO 36.9 IN ADULT (MULTI): Primary | ICD-10-CM

## 2024-07-20 PROBLEM — F41.0 PANIC ATTACK DUE TO EXCEPTIONAL STRESS: Status: ACTIVE | Noted: 2024-07-20

## 2024-07-20 PROBLEM — F43.0 PANIC ATTACK DUE TO EXCEPTIONAL STRESS: Status: ACTIVE | Noted: 2024-07-20

## 2024-07-20 LAB
25(OH)D3 SERPL-MCNC: 34 NG/ML (ref 30–100)
BASOPHILS # BLD AUTO: 0.03 X10*3/UL (ref 0–0.1)
BASOPHILS NFR BLD AUTO: 0.6 %
CHOLEST SERPL-MCNC: 161 MG/DL (ref 0–199)
CHOLESTEROL/HDL RATIO: 2.4
EOSINOPHIL # BLD AUTO: 0.09 X10*3/UL (ref 0–0.7)
EOSINOPHIL NFR BLD AUTO: 1.8 %
ERYTHROCYTE [DISTWIDTH] IN BLOOD BY AUTOMATED COUNT: 14.3 % (ref 11.5–14.5)
FERRITIN SERPL-MCNC: 20 NG/ML (ref 8–150)
FOLATE SERPL-MCNC: >24 NG/ML
HCT VFR BLD AUTO: 40.2 % (ref 36–46)
HDLC SERPL-MCNC: 65.8 MG/DL
HGB BLD-MCNC: 12.5 G/DL (ref 12–16)
IMM GRANULOCYTES # BLD AUTO: 0.01 X10*3/UL (ref 0–0.7)
IMM GRANULOCYTES NFR BLD AUTO: 0.2 % (ref 0–0.9)
IRON SATN MFR SERPL: 18 % (ref 25–45)
IRON SERPL-MCNC: 77 UG/DL (ref 35–150)
LDLC SERPL CALC-MCNC: 76 MG/DL
LYMPHOCYTES # BLD AUTO: 1.56 X10*3/UL (ref 1.2–4.8)
LYMPHOCYTES NFR BLD AUTO: 31.2 %
MAGNESIUM SERPL-MCNC: 2.16 MG/DL (ref 1.6–2.4)
MCH RBC QN AUTO: 28 PG (ref 26–34)
MCHC RBC AUTO-ENTMCNC: 31.1 G/DL (ref 32–36)
MCV RBC AUTO: 90 FL (ref 80–100)
MONOCYTES # BLD AUTO: 0.34 X10*3/UL (ref 0.1–1)
MONOCYTES NFR BLD AUTO: 6.8 %
NEUTROPHILS # BLD AUTO: 2.97 X10*3/UL (ref 1.2–7.7)
NEUTROPHILS NFR BLD AUTO: 59.4 %
NON HDL CHOLESTEROL: 95 MG/DL (ref 0–149)
NRBC BLD-RTO: 0 /100 WBCS (ref 0–0)
PLATELET # BLD AUTO: 267 X10*3/UL (ref 150–450)
RBC # BLD AUTO: 4.47 X10*6/UL (ref 4–5.2)
TIBC SERPL-MCNC: 419 UG/DL (ref 240–445)
TRANSFERRIN SERPL-MCNC: 330 MG/DL (ref 200–360)
TRIGL SERPL-MCNC: 96 MG/DL (ref 0–149)
TSH SERPL-ACNC: 2.12 MIU/L (ref 0.44–3.98)
UIBC SERPL-MCNC: 342 UG/DL (ref 110–370)
VIT B12 SERPL-MCNC: 842 PG/ML (ref 211–911)
VLDL: 19 MG/DL (ref 0–40)
WBC # BLD AUTO: 5 X10*3/UL (ref 4.4–11.3)

## 2024-07-20 PROCEDURE — 84466 ASSAY OF TRANSFERRIN: CPT

## 2024-07-20 PROCEDURE — 82607 VITAMIN B-12: CPT

## 2024-07-20 PROCEDURE — 84597 ASSAY OF VITAMIN K: CPT

## 2024-07-20 PROCEDURE — 84425 ASSAY OF VITAMIN B-1: CPT

## 2024-07-20 PROCEDURE — 36415 COLL VENOUS BLD VENIPUNCTURE: CPT

## 2024-07-20 PROCEDURE — 83540 ASSAY OF IRON: CPT

## 2024-07-20 PROCEDURE — 82728 ASSAY OF FERRITIN: CPT

## 2024-07-20 PROCEDURE — 83550 IRON BINDING TEST: CPT

## 2024-07-20 PROCEDURE — 84443 ASSAY THYROID STIM HORMONE: CPT

## 2024-07-20 PROCEDURE — 80061 LIPID PANEL: CPT

## 2024-07-20 PROCEDURE — 85025 COMPLETE CBC W/AUTO DIFF WBC: CPT

## 2024-07-20 PROCEDURE — 84630 ASSAY OF ZINC: CPT

## 2024-07-20 PROCEDURE — 83735 ASSAY OF MAGNESIUM: CPT

## 2024-07-20 PROCEDURE — 83970 ASSAY OF PARATHORMONE: CPT

## 2024-07-20 PROCEDURE — 82746 ASSAY OF FOLIC ACID SERUM: CPT

## 2024-07-20 PROCEDURE — 82306 VITAMIN D 25 HYDROXY: CPT

## 2024-07-20 RX ORDER — VENLAFAXINE HYDROCHLORIDE 150 MG/1
150 CAPSULE, EXTENDED RELEASE ORAL DAILY
Qty: 90 CAPSULE | Refills: 0 | Status: SHIPPED | OUTPATIENT
Start: 2024-07-20

## 2024-07-20 RX ORDER — TIRZEPATIDE 2.5 MG/.5ML
2.5 INJECTION, SOLUTION SUBCUTANEOUS
Qty: 2 ML | Refills: 0 | Status: SHIPPED | OUTPATIENT
Start: 2024-07-21 | End: 2024-08-20

## 2024-07-20 RX ORDER — BUSPIRONE HYDROCHLORIDE 10 MG/1
10 TABLET ORAL 3 TIMES DAILY
Qty: 90 TABLET | Refills: 3 | Status: SHIPPED | OUTPATIENT
Start: 2024-07-20 | End: 2025-07-20

## 2024-07-20 RX ORDER — PROPRANOLOL HYDROCHLORIDE 20 MG/1
20 TABLET ORAL 2 TIMES DAILY
Qty: 60 TABLET | Refills: 2 | Status: SHIPPED | OUTPATIENT
Start: 2024-07-20 | End: 2025-01-16

## 2024-07-20 ASSESSMENT — ANXIETY QUESTIONNAIRES
GAD7 TOTAL SCORE: 7
4. TROUBLE RELAXING: NOT AT ALL
1. FEELING NERVOUS, ANXIOUS, OR ON EDGE: MORE THAN HALF THE DAYS
7. FEELING AFRAID AS IF SOMETHING AWFUL MIGHT HAPPEN: SEVERAL DAYS
IF YOU CHECKED OFF ANY PROBLEMS ON THIS QUESTIONNAIRE, HOW DIFFICULT HAVE THESE PROBLEMS MADE IT FOR YOU TO DO YOUR WORK, TAKE CARE OF THINGS AT HOME, OR GET ALONG WITH OTHER PEOPLE: EXTREMELY DIFFICULT
3. WORRYING TOO MUCH ABOUT DIFFERENT THINGS: SEVERAL DAYS
6. BECOMING EASILY ANNOYED OR IRRITABLE: MORE THAN HALF THE DAYS
2. NOT BEING ABLE TO STOP OR CONTROL WORRYING: SEVERAL DAYS
5. BEING SO RESTLESS THAT IT IS HARD TO SIT STILL: NOT AT ALL

## 2024-07-20 ASSESSMENT — LIFESTYLE VARIABLES
AUDIT-C TOTAL SCORE: 2
SKIP TO QUESTIONS 9-10: 1
HOW OFTEN DO YOU HAVE SIX OR MORE DRINKS ON ONE OCCASION: NEVER
HOW OFTEN DO YOU HAVE A DRINK CONTAINING ALCOHOL: 2-4 TIMES A MONTH
HOW MANY STANDARD DRINKS CONTAINING ALCOHOL DO YOU HAVE ON A TYPICAL DAY: 1 OR 2

## 2024-07-20 ASSESSMENT — PATIENT HEALTH QUESTIONNAIRE - PHQ9
SUM OF ALL RESPONSES TO PHQ9 QUESTIONS 1 AND 2: 5
1. LITTLE INTEREST OR PLEASURE IN DOING THINGS: MORE THAN HALF THE DAYS
7. TROUBLE CONCENTRATING ON THINGS, SUCH AS READING THE NEWSPAPER OR WATCHING TELEVISION: NOT AT ALL
3. TROUBLE FALLING OR STAYING ASLEEP OR SLEEPING TOO MUCH: MORE THAN HALF THE DAYS
6. FEELING BAD ABOUT YOURSELF - OR THAT YOU ARE A FAILURE OR HAVE LET YOURSELF OR YOUR FAMILY DOWN: MORE THAN HALF THE DAYS
5. POOR APPETITE OR OVEREATING: MORE THAN HALF THE DAYS
2. FEELING DOWN, DEPRESSED OR HOPELESS: NEARLY EVERY DAY
9. THOUGHTS THAT YOU WOULD BE BETTER OFF DEAD, OR OF HURTING YOURSELF: NOT AT ALL
4. FEELING TIRED OR HAVING LITTLE ENERGY: NEARLY EVERY DAY
SUM OF ALL RESPONSES TO PHQ QUESTIONS 1-9: 14
10. IF YOU CHECKED OFF ANY PROBLEMS, HOW DIFFICULT HAVE THESE PROBLEMS MADE IT FOR YOU TO DO YOUR WORK, TAKE CARE OF THINGS AT HOME, OR GET ALONG WITH OTHER PEOPLE: EXTREMELY DIFFICULT
8. MOVING OR SPEAKING SO SLOWLY THAT OTHER PEOPLE COULD HAVE NOTICED. OR THE OPPOSITE, BEING SO FIGETY OR RESTLESS THAT YOU HAVE BEEN MOVING AROUND A LOT MORE THAN USUAL: NOT AT ALL

## 2024-07-20 NOTE — PROGRESS NOTES
"Subjective   Patient ID: Mercedes Burnham is a 49 y.o. female who presents for Annual Exam, but too early for insurance coverage.    HPI  STRESS:  Taking leave of absence 7/1/2024 for increased stress  Dealing with family situations that have arisen    Fatigued and decreased motivation to carry out daily activities  Difficulty concentrating to carry out basic daily activities.  Sleeping poorly - restless and mind racing worrying about family situation    RTW tentatively 8/18/2024    S/P BARIATRIC PROCEDURE:  Surgery 2019  She did lose significant weight but recently gaining gradually    RISK FACTORS:  Insulin resistance/Prediabetes  GEMA  BrCA gene positive    GEMA: not using her CPAP any longer  Stopped after she lost weight post bariatric procedure  Helped with headaches at that time  Now sleeping poorly and difficulty falling and staying asleep.    Wakes at 4:30 am for work and goes to bed 9-10 pm    Unsure if stress playing a role in her sleep patterns        Review of Systems    Review of Systems negative except as noted in HPI and Chief complaint.     Objective   Patient Health Questionnaire-9 Score: 14     MICHELLE-7 Total Score: 7     VITALS:  /80 (BP Location: Left arm, Patient Position: Sitting, BP Cuff Size: Adult)   Pulse 75   Ht 1.715 m (5' 7.5\")   Wt 107 kg (235 lb)   SpO2 99%   BMI 36.26 kg/m²      Physical Exam  Constitutional:       General: She is not in acute distress.     Appearance: Normal appearance. She is not ill-appearing.   HENT:      Head: Normocephalic and atraumatic.   Neck:      Vascular: No carotid bruit.   Cardiovascular:      Rate and Rhythm: Normal rate and regular rhythm.      Pulses: Normal pulses.      Heart sounds: Normal heart sounds. No murmur heard.     No gallop.   Pulmonary:      Effort: Pulmonary effort is normal.      Breath sounds: Normal breath sounds. No wheezing, rhonchi or rales.   Musculoskeletal:      Cervical back: Normal range of motion and neck supple. No " Endocrinology  Nohemilynda Veliz, JADA, [address]  [place], [address]  Phone [phone]  Fax [phone]      Osteoporosis  Andrey Stokes is a 58 y.o. female  who was seen for management of adrenal nodule,  osteoporosis. PCP :  Eunice Meyer MD    Last A1C:   Lab Results   Component Value Date    LABA1C 5.6 08/28/2020    LABA1C 5.2 02/28/2020    LABA1C 5.5 08/22/2019     Last BP Readings:   BP Readings from Last 3 Encounters:   01/26/21 120/82   12/15/20 (!) 107/56   12/15/20 130/80     Last LDL:   Lab Results   Component Value Date    LDLCALC 56 08/28/2020    LDLDIRECT 125 (H) 07/30/2018     Aspirin Use: no    Tobacco Use    Smoking status: Never Smoker    Smokeless tobacco: Never Used   Substance and Sexual Activity    Alcohol use: No    Drug use: No       Osteoporosis:   She was diagnosed with osteoporosis by bone density scan in DEXA scan in 06/20 showed lowest T score of -2.3 in Left femoral neck. Patient has history of fracture. The cause of osteoporosis is felt to be due to postmenopausal estrogen deficiency. She is currently being treated with calcium and vitamin D supplementation. She is currently being treated with Prolia    Osteoporosis Risk Factors   NonmodifiableWas found to      Personal Hx of fracture as an adult: yes -->L1 compression fracture of spine on CT abdomen in 05/20  Hx of fracture in first-degree relative:yes -  Father had a hip fracture in his 62s. Had supranuclear palsy .  race: yes  Advanced age: no  Female sex: yes  Dementia: no  Poor health/frailty: no     Potentially modifiable:  Tobacco use: no  Low body weight (<127 lbs): no  Estrogen deficiency     early menopause (age <45) or bilateral ovariectomy: yes; at 38     prolonged premenopausal amenorrhea (>1 yr): no  No calcium , vitamin D. Eats 1 cup of yogurt.    Walks regularly  Alcoholism: no  Recurrent falls: no  Inadequate physical activity: no    CREATININE (mg/dL)   Date Value   2020 1.4 (H)     Lab Results   Component Value Date    TSH 0.83 2010     Calcium (mg/dL)   Date Value   2020 10.3     No results found for: ALBUMIN  PTH (pg/mL)   Date Value   2020 41.5     Lab Results   Component Value Date    VITD25 36.6 2020        Adrenal nodule  Patient had a CT for calcium cardiac scoring in   which showed 11 mm adenoma in left adrenal gland. Repeat  CT of  adrenals  in May 2020 again showed 1.1 cm left adrenal adenoma with benign features. Given benign appearance, no further radiological follow-up is recommended.    Normal plasma metanephrines,  Cortisol  After 1 mg DST was 1.6 ( normal)   BP is well controlled on one medication.    No h/o malignancy. No H/o weight gain. No easy bruisability. No recent Steroid use. No Spells of headache, sweating and palpitations    She had renal stones in . Follows nephrology for fluctuating PTH. On calcitriol H/o Hypertension. On bystolic 5 mg daily. Had cough with lisinopril .   Had swelling with norvasc.        Past Medical History:   Diagnosis Date    Actinic keratosis     Breast disorder     CAD (coronary artery disease)     Chronic kidney disease     Hyperlipidemia     Hypertension     Infertility, female     S/P colonoscopy 2009    Tendinitis of foot      Family History   Problem Relation Age of Onset    Heart Disease Father         MI  at  age 48   [de-identified] High Blood Pressure Sister     Diabetes Sister     Kidney Disease Sister         transplant     High Blood Pressure Mother     High Cholesterol Mother     Arthritis Mother         gout    Heart Disease Mother     Cancer Mother     Heart Disease Brother     High Cholesterol Brother     High Blood Pressure Brother      Social History     Tobacco Use    Smoking status: Never Smoker    Smokeless tobacco: Never Used   Substance Use Topics    Alcohol use: No        Current Outpatient Medications   Medication Sig Dispense rigidity or tenderness.   Lymphadenopathy:      Cervical: No cervical adenopathy.   Skin:     General: Skin is warm and dry.   Neurological:      Mental Status: She is alert.   Psychiatric:         Mood and Affect: Mood normal.         Behavior: Behavior normal.         Assessment/Plan   Problem List Items Addressed This Visit       Anxiety and depression    Relevant Medications    venlafaxine XR (Effexor-XR) 150 mg 24 hr capsule    busPIRone (Buspar) 10 mg tablet    Other Relevant Orders    Follow Up In [facility]    Anxiety, generalized    Relevant Medications    venlafaxine XR (Effexor-XR) 150 mg 24 hr capsule    busPIRone (Buspar) 10 mg tablet    propranolol (Inderal) 20 mg tablet    Malnutrition following gastrointestinal surgery ([facility])    Relevant Orders    CBC and Auto Differential    Ferritin    Folate    Iron and TIBC    Lipid Panel    Magnesium    Parathyroid Hormone, Intact    Zinc, Serum or Plasma    Vitamin K    Vitamin D 25-Hydroxy,Total (for eval of Vitamin D levels)    Vitamin B12    Vitamin B1, Whole Blood    Transferrin    Thyroid Stimulating Hormone    Obesity - Primary     Trial of Mounjaro prescribed will await PA decision.  Continue with diet and exercise modifications.  Follow up 1 month after starting medication.         Relevant Medications    tirzepatide (Mounjaro) 2.5 mg/0.5 mL pen injector (Start on 7/21/2024)    Prediabetes     Other Visit Diagnoses       Annual physical exam        Relevant Orders    CBC    TSH with reflex to Free T4 if abnormal          I have discussed the collaborative care model for this patient's behavioral health care.  Written detailed information and identifying the members of this care team was provided to patient.  They give permission for the Behavioral Health Manager (BHM) and psychiatric consultant to be included in their care with my continued primary management.  Patient made aware that services  Refill    Potassium Citrate ER 15 MEQ (1620 MG) TBCR       calcitRIOL (ROCALTROL) 0.25 MCG capsule Take 0.25 mcg by mouth every other day      nebivolol (BYSTOLIC) 5 MG tablet TAKE ONE TABLET BY MOUTH DAILY 90 tablet 1    aspirin 81 MG tablet Take 81 mg by mouth daily      atorvastatin (LIPITOR) 40 MG tablet Take 1 tablet by mouth daily 30 tablet 5    Melatonin 5 MG CHEW Take by mouth      denosumab (PROLIA) 60 MG/ML SOSY SC injection Inject 1 mL into the skin once for 1 dose 1 mL 0     No current facility-administered medications for this visit. Review of Systems   Constitutional: Negative for activity change, fatigue and unexpected weight change. Respiratory: Negative for cough, chest tightness, shortness of breath and wheezing. Cardiovascular: Negative for chest pain. Endocrine: Negative for cold intolerance, heat intolerance, polydipsia, polyphagia and polyuria. Musculoskeletal: Negative for arthralgias, back pain, gait problem, joint swelling, myalgias, neck pain and neck stiffness. Neurological: Negative for dizziness, weakness and light-headedness. Psychiatric/Behavioral: Negative for confusion. Vitals:    01/26/21 0956   BP: 120/82   Pulse: 73   SpO2: 95%   Weight: 187 lb (84.8 kg)   Height: 5' 5.12\" (1.654 m)       Physical Exam  Constitutional: She is oriented to person, place, and time. She appears well-developed and well-nourished. HENT:   Head: Normocephalic and atraumatic. Eyes:Pupils are equal, round, and reactive to light. Neck: Normal range of motion. Neck supple. No thyromegaly present. Cardiovascular: Normal rate, regular rhythm and normal heart sounds. Pulmonary/Chest:Effort normal and breath sounds normal.   Abdominal: Soft. Bowel sounds are normal.   Musculoskeletal: Normal range of motion. She exhibits no tenderness. Musculoskeletal exam:  gait: intact without difficulty. Steady without assistance.  Has her foot (L)  In boot, s/p hammer toe provided as part of the Collaborative Care Model are subject to insurance billing.      FOLLOW UP 1 MONTH, SOONER WITH ANY PROBLEMS OR CONCERNS.    Time Spent  Prep time on day of patient encounter: 5 minutes  Time spent directly with patient, family or caregiver: 35 minutes  Additional Time Spent on Patient Care Activities: 5 minutes ([facility] paperwork filled out)  Documentation Time: 10 minutes  Other Time Spent: 0 minutes  Total: 55 minutes       surgery  Spine: spinal contours are normal.  No spine tenderness to palpation or percussion. Ribs and pelvis: ribs appear normal. > Two finger spaces between ribs and pelvis. Neurological: She is alert and oriented to person, place, and time. She has normal reflexes. Gait normal.   Able to rise from chair without using arms. No apparent focal motor or sensory deficit. Reflexes brisk and symmetric. Coordination appears normal.  Skin: Skin is warm and dry. Psychiatric: She has a normal mood and affect. Her behavior is normal. Judgment and thought content normal.     Assessment/Plan:    Problem List Items Addressed This Visit     Adrenal nodule ([facility] [address]     Incidental finding on adrenal nodule in Cardiac CT in 03/2020  No symptoms suggestive of adrenal hyperfunction  BP is well controlled on one medication.    If her BP is uncontrolled in the future, will do further confirmatory testing.            Other osteoporosis without current pathological fracture     Reviewed existing plan of care per Dr Tiki Plummer  Had second injection of prolia at visit  Tolerates well  Next DEXA in 06/2020           Other Visit Diagnoses     Age-related osteoporosis without current pathological fracture    -  Primary    Relevant Medications    denosumab (PROLIA) SC injection 60 mg (Completed)        Greater than 20 minutes spent directly counseling patient about topics listed above (such as lifestyle modifications, preventative screenings and/or disease related processes). Return in about 1 year (around 1/26/2022).

## 2024-07-20 NOTE — ASSESSMENT & PLAN NOTE
Trial of Mounjaro prescribed will await PA decision.  Continue with diet and exercise modifications.  Follow up 1 month after starting medication.

## 2024-07-20 NOTE — PROGRESS NOTES
"Subjective   Mercedes Burnham is a 49 y.o. female and is here for a comprehensive physical exam and the following concerns:    Do you take any herbs or supplements that were not prescribed by a doctor? {yes/no/not asked:9010}  Are you taking calcium supplements? {yes/no:076100}  Are you taking aspirin daily? {yes/no:310934}    SOC Hx:   Tobacco Use: Low Risk  (2024)    Patient History     Smoking Tobacco Use: Never     Smokeless Tobacco Use: Never     Passive Exposure: Not on file     Alcohol Use: Not on file       DIET: {diet types:48941}    EXERCISE: {EXERCISE:29891}    ELIMINATION: no constipation or diarrhea  COLON CA SCREENING: {colon screening method/date:}    URINARY SYSTEM: {Symptoms; urinary:85525}    SLEEP:  {Sleep pattern:}    MOODS:   Patient Health Questionnaire-9 Score: 14    MICHELLE-7 Total Score: 7      History:  Menstrual cycles - {kmrmenstrualcycles:89720}  LMP: No LMP recorded.  Last pap date: *** {PAP TESTIN}  Abnormal pap? {yes/no:9010}    MAMMO:  DEXA:    Review of Systems   Review of Systems negative except as noted in HPI and Chief complaint.     Objective     VITALS:  /80 (BP Location: Left arm, Patient Position: Sitting, BP Cuff Size: Adult)   Pulse 75   Ht 1.715 m (5' 7.5\")   Wt 107 kg (235 lb)   SpO2 99%   BMI 36.26 kg/m²      Physical Exam    Assessment/Plan     Healthy female exam.      1. Please have labs drawn at your earliest convenience.  You should fast 12 hours prior to arriving at the lab - during these 12 hours you may have water, black coffee, black tea - nothing with cream or sugar and no solid foods.    Our office will contact you with results after they have been reviewed.  Please allow 48 - 72 hours for most results, some may take longer.  Please keep in mind that results may post immediately to your online portal, even before we have a chance to review them.  Once we have had the opportunity to review we will post comments and have our " staff contact you with results and any instructions.  Please call our office if you do not hear from our office in 7 days.     2. Patient Counseling:  --Nutrition: Stressed importance of moderation in sodium/caffeine intake, saturated fat and cholesterol, caloric balance, sufficient intake of fresh fruits, vegetables, fiber, calcium, iron, and 1 mg of folate supplement per day (for females capable of pregnancy).  --Exercise: Stressed the importance of regular exercise.   --Immunizations reviewed.  --Discussed benefits of screening colonoscopy(patients > 45 years of age)    3. Discussed the patient's BMI with her.  The BMI {[facility]29717}  { AMB  BMI follow up/not measured:02887}    Problem List Items Addressed This Visit    None       Follow up {follow-up interval:02309}

## 2024-07-21 LAB — PTH-INTACT SERPL-MCNC: 96 PG/ML (ref 18.5–88)

## 2024-07-23 LAB — ZINC SERPL-MCNC: 56.5 UG/DL (ref 60–120)

## 2024-07-24 LAB — PHYTONADIONE SERPL-MCNC: 0.36 NMOL/L (ref 0.22–4.88)

## 2024-07-25 LAB — VIT B1 PYROPHOSHATE BLD-SCNC: 203 NMOL/L (ref 70–180)

## 2024-08-01 ENCOUNTER — DOCUMENTATION (OUTPATIENT)
Dept: BEHAVIORAL HEALTH | Facility: CLINIC | Age: 49
End: 2024-08-01

## 2024-08-01 ENCOUNTER — APPOINTMENT (OUTPATIENT)
Dept: PRIMARY CARE | Facility: CLINIC | Age: 49
End: 2024-08-01
Payer: COMMERCIAL

## 2024-08-01 DIAGNOSIS — F32.A ANXIETY AND DEPRESSION: ICD-10-CM

## 2024-08-01 DIAGNOSIS — F41.9 ANXIETY AND DEPRESSION: ICD-10-CM

## 2024-08-01 ASSESSMENT — ANXIETY QUESTIONNAIRES
6. BECOMING EASILY ANNOYED OR IRRITABLE: SEVERAL DAYS
IF YOU CHECKED OFF ANY PROBLEMS ON THIS QUESTIONNAIRE, HOW DIFFICULT HAVE THESE PROBLEMS MADE IT FOR YOU TO DO YOUR WORK, TAKE CARE OF THINGS AT HOME, OR GET ALONG WITH OTHER PEOPLE: VERY DIFFICULT
2. NOT BEING ABLE TO STOP OR CONTROL WORRYING: MORE THAN HALF THE DAYS
1. FEELING NERVOUS, ANXIOUS, OR ON EDGE: NEARLY EVERY DAY
GAD7 TOTAL SCORE: 9
3. WORRYING TOO MUCH ABOUT DIFFERENT THINGS: SEVERAL DAYS
4. TROUBLE RELAXING: SEVERAL DAYS
7. FEELING AFRAID AS IF SOMETHING AWFUL MIGHT HAPPEN: SEVERAL DAYS
5. BEING SO RESTLESS THAT IT IS HARD TO SIT STILL: NOT AT ALL

## 2024-08-01 ASSESSMENT — PATIENT HEALTH QUESTIONNAIRE - PHQ9
SUM OF ALL RESPONSES TO PHQ9 QUESTIONS 1 & 2: 5
8. MOVING OR SPEAKING SO SLOWLY THAT OTHER PEOPLE COULD HAVE NOTICED. OR THE OPPOSITE, BEING SO FIGETY OR RESTLESS THAT YOU HAVE BEEN MOVING AROUND A LOT MORE THAN USUAL: NOT AT ALL
SUM OF ALL RESPONSES TO PHQ QUESTIONS 1-9: 12
1. LITTLE INTEREST OR PLEASURE IN DOING THINGS: MORE THAN HALF THE DAYS
5. POOR APPETITE OR OVEREATING: SEVERAL DAYS
4. FEELING TIRED OR HAVING LITTLE ENERGY: MORE THAN HALF THE DAYS
6. FEELING BAD ABOUT YOURSELF - OR THAT YOU ARE A FAILURE OR HAVE LET YOURSELF OR YOUR FAMILY DOWN: MORE THAN HALF THE DAYS
3. TROUBLE FALLING OR STAYING ASLEEP: SEVERAL DAYS
7. TROUBLE CONCENTRATING ON THINGS, SUCH AS READING THE NEWSPAPER OR WATCHING TELEVISION: SEVERAL DAYS
9. THOUGHTS THAT YOU WOULD BE BETTER OFF DEAD, OR OF HURTING YOURSELF: NOT AT ALL
10. IF YOU CHECKED OFF ANY PROBLEMS, HOW DIFFICULT HAVE THESE PROBLEMS MADE IT FOR YOU TO DO YOUR WORK, TAKE CARE OF THINGS AT HOME, OR GET ALONG WITH OTHER PEOPLE: VERY DIFFICULT
2. FEELING DOWN, DEPRESSED OR HOPELESS: NEARLY EVERY DAY
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
## 2024-08-01 NOTE — Clinical Note
For Mercedes, she reports of low mood, low motivation and energy, along with isolating and non-suicidal self-injurious behavior cutting her wrists intermittently over the past 18+ months, along with changes in appetite, recommend dose increase in Effexor XR to 225mg po daily.  Fide is going to talk to her about IOP therapy v. Weekly sessions with her while Mercedes is off work on short-term disability

## 2024-08-01 NOTE — PROGRESS NOTES
[facility] Psychiatry Consult Note     Mercedes Burnham is a 49 y.o., referred to Collaborative Care for acute stressors and concerns for depression. I have reviewed the patient with the behavioral health manager and reviewed the patient's electronic record.    Recommendations:     With Mercedes reports of low mood, low motivation and energy, along with isolating and non-suicidal self-injurious behavior (NSSIB) over the past 18+ months, along with changes in appetite, recommend dose increase in Effexor XR to 225mg po daily.    Goals of working on identifying stressors for when she using self-injury as an emotional release and building up healthy and safe coping skills. She has self-identified goals of working on constructive outlets for herself. Consideration of IOP level therapy while on short-term disability v. Weekly therapy in Collaborative care.     Current Psychotropic medications:   Effexor XR 150mg po daily for depression and anxiety  Bupar 10mg po TID for anxiety  Propranolol 20mg po BID for anxiety    Collateral with [facility]:   Recent stressors: son's arrest, took recent leave from work to be available for her son at any moment  She has been isolating to home and not doing much outside of the home.   Over the past 18+ months, NSSIB of cutting her wrists. This has been less frequent the past few weeks.   Denies passive nor active SI in over 10 years.   Denies prior suicide attempts.   Lives with unemployed boyfriend and she has been the primary earner in the relationship and her adult son (age 19) prior to his arrest at the end of 06/2024.  She notes self-esteem concerns and anxiety are present.   She has h/o GEMA.     Patient Health Questionnaire-9 Score: 14 (7/20/2024  8:31 AM)    The above treatment considerations and suggestions are based on consultations with the patient's care manager and a review of information available in the electronic medical record. I have not personally examined the patient. All  recommendations should be implemented with consideration of the patient's relevant prior history and current clinical status. Please feel free to call me with any questions about the care of this patient. I can easily be reached through [employer].

## 2024-08-06 ENCOUNTER — SOCIAL WORK (OUTPATIENT)
Dept: PRIMARY CARE | Facility: CLINIC | Age: 49
End: 2024-08-06
Payer: COMMERCIAL

## 2024-08-06 ENCOUNTER — LAB (OUTPATIENT)
Dept: LAB | Facility: LAB | Age: 49
End: 2024-08-06
Payer: COMMERCIAL

## 2024-08-06 ENCOUNTER — APPOINTMENT (OUTPATIENT)
Dept: PRIMARY CARE | Facility: CLINIC | Age: 49
End: 2024-08-06
Payer: COMMERCIAL

## 2024-08-06 VITALS
WEIGHT: 234 LBS | OXYGEN SATURATION: 98 % | DIASTOLIC BLOOD PRESSURE: 70 MMHG | BODY MASS INDEX: 36.11 KG/M2 | SYSTOLIC BLOOD PRESSURE: 120 MMHG | HEART RATE: 75 BPM

## 2024-08-06 DIAGNOSIS — Z00.00 ANNUAL PHYSICAL EXAM: Primary | ICD-10-CM

## 2024-08-06 DIAGNOSIS — F41.9 ANXIETY AND DEPRESSION: ICD-10-CM

## 2024-08-06 DIAGNOSIS — Z00.00 ANNUAL PHYSICAL EXAM: ICD-10-CM

## 2024-08-06 DIAGNOSIS — E21.5 PARATHYROID ABNORMALITY (MULTI): ICD-10-CM

## 2024-08-06 DIAGNOSIS — F32.A ANXIETY AND DEPRESSION: ICD-10-CM

## 2024-08-06 DIAGNOSIS — F41.1 ANXIETY, GENERALIZED: ICD-10-CM

## 2024-08-06 DIAGNOSIS — I10 BENIGN HYPERTENSION: ICD-10-CM

## 2024-08-06 LAB
ALBUMIN SERPL BCP-MCNC: 3.4 G/DL (ref 3.4–5)
ALP SERPL-CCNC: 86 U/L (ref 33–110)
ALT SERPL W P-5'-P-CCNC: 38 U/L (ref 7–45)
ANION GAP SERPL CALC-SCNC: 12 MMOL/L (ref 10–20)
AST SERPL W P-5'-P-CCNC: 52 U/L (ref 9–39)
BILIRUB SERPL-MCNC: 0.3 MG/DL (ref 0–1.2)
BUN SERPL-MCNC: 16 MG/DL (ref 6–23)
CA-I BLD-SCNC: 1.17 MMOL/L (ref 1.1–1.33)
CALCIUM SERPL-MCNC: 8.7 MG/DL (ref 8.6–10.6)
CHLORIDE SERPL-SCNC: 107 MMOL/L (ref 98–107)
CO2 SERPL-SCNC: 29 MMOL/L (ref 21–32)
CREAT SERPL-MCNC: 1.07 MG/DL (ref 0.5–1.05)
EGFRCR SERPLBLD CKD-EPI 2021: 63 ML/MIN/1.73M*2
GLUCOSE SERPL-MCNC: 88 MG/DL (ref 74–99)
POTASSIUM SERPL-SCNC: 4.5 MMOL/L (ref 3.5–5.3)
PROT SERPL-MCNC: 5.5 G/DL (ref 6.4–8.2)
SODIUM SERPL-SCNC: 143 MMOL/L (ref 136–145)

## 2024-08-06 PROCEDURE — 99214 OFFICE O/P EST MOD 30 MIN: CPT | Performed by: FAMILY MEDICINE

## 2024-08-06 PROCEDURE — 36415 COLL VENOUS BLD VENIPUNCTURE: CPT

## 2024-08-06 PROCEDURE — 80053 COMPREHEN METABOLIC PANEL: CPT

## 2024-08-06 PROCEDURE — 1036F TOBACCO NON-USER: CPT | Performed by: FAMILY MEDICINE

## 2024-08-06 PROCEDURE — 3078F DIAST BP <80 MM HG: CPT | Performed by: FAMILY MEDICINE

## 2024-08-06 PROCEDURE — 82330 ASSAY OF CALCIUM: CPT

## 2024-08-06 PROCEDURE — 99396 PREV VISIT EST AGE 40-64: CPT | Performed by: FAMILY MEDICINE

## 2024-08-06 PROCEDURE — 3074F SYST BP LT 130 MM HG: CPT | Performed by: FAMILY MEDICINE

## 2024-08-06 RX ORDER — VENLAFAXINE HYDROCHLORIDE 150 MG/1
300 CAPSULE, EXTENDED RELEASE ORAL DAILY
Qty: 180 CAPSULE | Refills: 0 | Status: SHIPPED | OUTPATIENT
Start: 2024-08-06 | End: 2024-08-06 | Stop reason: DRUGHIGH

## 2024-08-06 RX ORDER — VENLAFAXINE HYDROCHLORIDE 75 MG/1
75 CAPSULE, EXTENDED RELEASE ORAL DAILY
Qty: 90 CAPSULE | Refills: 1 | Status: SHIPPED | OUTPATIENT
Start: 2024-08-06

## 2024-08-06 RX ORDER — VENLAFAXINE HYDROCHLORIDE 150 MG/1
CAPSULE, EXTENDED RELEASE ORAL
Qty: 90 CAPSULE | Refills: 0 | Status: SHIPPED | OUTPATIENT
Start: 2024-08-06

## 2024-08-06 ASSESSMENT — PATIENT HEALTH QUESTIONNAIRE - PHQ9
SUM OF ALL RESPONSES TO PHQ9 QUESTIONS 1 AND 2: 4
2. FEELING DOWN, DEPRESSED OR HOPELESS: NEARLY EVERY DAY
10. IF YOU CHECKED OFF ANY PROBLEMS, HOW DIFFICULT HAVE THESE PROBLEMS MADE IT FOR YOU TO DO YOUR WORK, TAKE CARE OF THINGS AT HOME, OR GET ALONG WITH OTHER PEOPLE: SOMEWHAT DIFFICULT
4. FEELING TIRED OR HAVING LITTLE ENERGY: NEARLY EVERY DAY
3. TROUBLE FALLING OR STAYING ASLEEP OR SLEEPING TOO MUCH: MORE THAN HALF THE DAYS
9. THOUGHTS THAT YOU WOULD BE BETTER OFF DEAD, OR OF HURTING YOURSELF: NOT AT ALL
6. FEELING BAD ABOUT YOURSELF - OR THAT YOU ARE A FAILURE OR HAVE LET YOURSELF OR YOUR FAMILY DOWN: SEVERAL DAYS
1. LITTLE INTEREST OR PLEASURE IN DOING THINGS: SEVERAL DAYS
SUM OF ALL RESPONSES TO PHQ QUESTIONS 1-9: 12
5. POOR APPETITE OR OVEREATING: SEVERAL DAYS
8. MOVING OR SPEAKING SO SLOWLY THAT OTHER PEOPLE COULD HAVE NOTICED. OR THE OPPOSITE, BEING SO FIGETY OR RESTLESS THAT YOU HAVE BEEN MOVING AROUND A LOT MORE THAN USUAL: NOT AT ALL
7. TROUBLE CONCENTRATING ON THINGS, SUCH AS READING THE NEWSPAPER OR WATCHING TELEVISION: SEVERAL DAYS

## 2024-08-06 ASSESSMENT — ANXIETY QUESTIONNAIRES
IF YOU CHECKED OFF ANY PROBLEMS ON THIS QUESTIONNAIRE, HOW DIFFICULT HAVE THESE PROBLEMS MADE IT FOR YOU TO DO YOUR WORK, TAKE CARE OF THINGS AT HOME, OR GET ALONG WITH OTHER PEOPLE: SOMEWHAT DIFFICULT
GAD7 TOTAL SCORE: 10
3. WORRYING TOO MUCH ABOUT DIFFERENT THINGS: MORE THAN HALF THE DAYS
IF YOU CHECKED OFF ANY PROBLEMS ON THIS QUESTIONNAIRE, HOW DIFFICULT HAVE THESE PROBLEMS MADE IT FOR YOU TO DO YOUR WORK, TAKE CARE OF THINGS AT HOME, OR GET ALONG WITH OTHER PEOPLE: SOMEWHAT DIFFICULT
5. BEING SO RESTLESS THAT IT IS HARD TO SIT STILL: NOT AT ALL
6. BECOMING EASILY ANNOYED OR IRRITABLE: MORE THAN HALF THE DAYS
7. FEELING AFRAID AS IF SOMETHING AWFUL MIGHT HAPPEN: SEVERAL DAYS
4. TROUBLE RELAXING: SEVERAL DAYS
1. FEELING NERVOUS, ANXIOUS, OR ON EDGE: MORE THAN HALF THE DAYS
5. BEING SO RESTLESS THAT IT IS HARD TO SIT STILL: NOT AT ALL
3. WORRYING TOO MUCH ABOUT DIFFERENT THINGS: MORE THAN HALF THE DAYS
1. FEELING NERVOUS, ANXIOUS, OR ON EDGE: MORE THAN HALF THE DAYS
7. FEELING AFRAID AS IF SOMETHING AWFUL MIGHT HAPPEN: SEVERAL DAYS
GAD7 TOTAL SCORE: 10
4. TROUBLE RELAXING: SEVERAL DAYS
2. NOT BEING ABLE TO STOP OR CONTROL WORRYING: MORE THAN HALF THE DAYS
6. BECOMING EASILY ANNOYED OR IRRITABLE: MORE THAN HALF THE DAYS
2. NOT BEING ABLE TO STOP OR CONTROL WORRYING: MORE THAN HALF THE DAYS

## 2024-08-06 NOTE — PROGRESS NOTES
Collaborative Care (CoCM)  Progress Note    Type of Interaction: In Office    Start Time: 12:42 PM    End Time: 1:00 PM    Appointment: Scheduled    Reason for Visit:   Chief Complaint   Patient presents with    Follow-up      Interval History / Patient Symptoms:   Subjective   Mercedes Burnham is a 49 y.o. female who presents for follow up for Collaborative Care services.     Patient Health Questionnaire-9 Score: 12 (8/6/2024 12:34 PM)  MICHELLE-7 Total Score: 10 (8/6/2024 12:46 PM)     Interventions Provided: Treatment Planning    Progress Made: N/A    Response to Intervention:  We discussed the following elements from the last appointment: z  Pt had a PCP appt prior to this appointment; patient shared that she is going back to work starting on Sunday, that her short term disability was not approved.  Discussed looking into FMLA.    Anxious about going back to work and not being able to answer phone calls from her son, that he might need her and she won't be available.  Patient stated she is going to wear an earbud so she won't miss his calls when working.    Reviewed questions from consulting psychiatrist regarding CPAP- pt reports after her bariatric surgery, she stopped using it because the settings were off and it made her feel 'weird'.   Per PCP, pt will be starting higher dose of venlafaxine- 225mg, per consulting psychiatrist recommendations.    Plan:   Follow up in 2 weeks to start working on identifying stressors that lead to maladaptive behaviors and finding more manageable ways to deal with stressors    Follow Up / Next Appointment: Next appointment: 08/20/24

## 2024-08-06 NOTE — PROGRESS NOTES
Subjective   Mercedes Burnham is a 49 y.o. female and is here for a comprehensive physical exam    Do you take any herbs or supplements that were not prescribed by a doctor? not asked  Are you taking calcium supplements? yes  Are you taking aspirin daily? no    SOC Hx: not   Tobacco Use: Low Risk  (8/6/2024)    Patient History     Smoking Tobacco Use: Never     Smokeless Tobacco Use: Never     Passive Exposure: Not on file     Alcohol Use: Not At Risk (7/20/2024)    AUDIT-C     Frequency of Alcohol Consumption: 2-4 times a month     Average Number of Drinks: 1 or 2     Frequency of Binge Drinking: Never       DIET: general    EXERCISE: irregularly    ELIMINATION: no constipation or diarrhea  COLON CA SCREENING: COLOGUARD 8/30/2023 REPEAT DUE 3 years    URINARY SYSTEM: none    SLEEP:  disturbed    MOODS: increased stressors with family situations  Employer denied Leave of Absence cut short by a week  Patient Health Questionnaire-9 Score: 12    MICHELLE-7 Total Score: 10      History:  Menstrual cycles - menopausal  LMP: No LMP recorded.  Last pap date: 1/25/2021 CO-TEST  Abnormal pap? no  MAMMO:  DEXA: n/a    Review of Systems   Review of Systems negative except as noted in HPI and Chief complaint.     Objective     VITALS:  /70 (BP Location: Left arm, Patient Position: Sitting, BP Cuff Size: Adult)   Pulse 75   Wt 106 kg (234 lb)   SpO2 98%   BMI 36.11 kg/m²      Physical Exam    Assessment/Plan     Healthy female exam.      1. Please have labs drawn at your earliest convenience.  You should fast 12 hours prior to arriving at the lab - during these 12 hours you may have water, black coffee, black tea - nothing with cream or sugar and no solid foods.    Our office will contact you with results after they have been reviewed.  Please allow 48 - 72 hours for most results, some may take longer.  Please keep in mind that results may post immediately to your online portal, even before we have a chance to  review them.  Once we have had the opportunity to review we will post comments and have our staff contact you with results and any instructions.  Please call our office if you do not hear from our office in 7 days.     2. Patient Counseling:  --Nutrition: Stressed importance of moderation in sodium/caffeine intake, saturated fat and cholesterol, caloric balance, sufficient intake of fresh fruits, vegetables, fiber, calcium, iron, and 1 mg of folate supplement per day (for females capable of pregnancy).  --Exercise: Stressed the importance of regular exercise.   --Immunizations reviewed.  --Discussed benefits of screening colonoscopy(patients > 45 years of age)    3. Discussed the patient's BMI with her.  The BMI is in the acceptable range.  BMI was above normal measurement. Current weight: 106 kg (234 lb)  Weight change since last visit (-) denotes wt loss -1 lbs   Weight loss needed to achieve BMI 25:   Lbs  Weight loss needed to achieve BMI 30:   Lbs  Provided instructions on dietary changes  Provided instructions on exercise  Advised to Increase physical activity    Problem List Items Addressed This Visit       Anxiety and depression    Relevant Medications    venlafaxine XR (Effexor-XR) 150 mg 24 hr capsule    venlafaxine XR (Effexor-XR) 75 mg 24 hr capsule    Anxiety, generalized    Relevant Medications    venlafaxine XR (Effexor-XR) 150 mg 24 hr capsule    venlafaxine XR (Effexor-XR) 75 mg 24 hr capsule     Other Visit Diagnoses       Annual physical exam    -  Primary    Relevant Orders    Comprehensive metabolic panel (Completed)    Parathyroid abnormality (Multi)        Relevant Orders    Calcium, ionized (Completed)                                                                                                                                                                                                                                                                                                                                       Follow up in 3 months

## 2024-08-07 NOTE — ASSESSMENT & PLAN NOTE
Increasing Venlafaxine to 225 mg   Adjusted medication doses.  Follow up with Behavioral Health Collaborative Care Team and follow up with my office in 3 months.

## 2024-08-08 DIAGNOSIS — E21.5 PARATHYROID ABNORMALITY (MULTI): Primary | ICD-10-CM

## 2024-08-16 PROBLEM — F43.23 ADJUSTMENT DISORDER WITH MIXED ANXIETY AND DEPRESSED MOOD: Status: ACTIVE | Noted: 2024-08-01

## 2024-08-20 ENCOUNTER — APPOINTMENT (OUTPATIENT)
Dept: PRIMARY CARE | Facility: CLINIC | Age: 49
End: 2024-08-20
Payer: COMMERCIAL

## 2024-08-20 DIAGNOSIS — F43.23 ADJUSTMENT DISORDER WITH MIXED ANXIETY AND DEPRESSED MOOD: Primary | ICD-10-CM

## 2024-08-20 ASSESSMENT — ANXIETY QUESTIONNAIRES
5. BEING SO RESTLESS THAT IT IS HARD TO SIT STILL: NOT AT ALL
GAD7 TOTAL SCORE: 8
1. FEELING NERVOUS, ANXIOUS, OR ON EDGE: MORE THAN HALF THE DAYS
IF YOU CHECKED OFF ANY PROBLEMS ON THIS QUESTIONNAIRE, HOW DIFFICULT HAVE THESE PROBLEMS MADE IT FOR YOU TO DO YOUR WORK, TAKE CARE OF THINGS AT HOME, OR GET ALONG WITH OTHER PEOPLE: SOMEWHAT DIFFICULT
2. NOT BEING ABLE TO STOP OR CONTROL WORRYING: SEVERAL DAYS
4. TROUBLE RELAXING: SEVERAL DAYS
3. WORRYING TOO MUCH ABOUT DIFFERENT THINGS: MORE THAN HALF THE DAYS
6. BECOMING EASILY ANNOYED OR IRRITABLE: SEVERAL DAYS
7. FEELING AFRAID AS IF SOMETHING AWFUL MIGHT HAPPEN: SEVERAL DAYS

## 2024-08-20 ASSESSMENT — PATIENT HEALTH QUESTIONNAIRE - PHQ9
SUM OF ALL RESPONSES TO PHQ9 QUESTIONS 1 & 2: 4
9. THOUGHTS THAT YOU WOULD BE BETTER OFF DEAD, OR OF HURTING YOURSELF: NOT AT ALL
2. FEELING DOWN, DEPRESSED OR HOPELESS: NEARLY EVERY DAY
10. IF YOU CHECKED OFF ANY PROBLEMS, HOW DIFFICULT HAVE THESE PROBLEMS MADE IT FOR YOU TO DO YOUR WORK, TAKE CARE OF THINGS AT HOME, OR GET ALONG WITH OTHER PEOPLE: SOMEWHAT DIFFICULT
8. MOVING OR SPEAKING SO SLOWLY THAT OTHER PEOPLE COULD HAVE NOTICED. OR THE OPPOSITE, BEING SO FIGETY OR RESTLESS THAT YOU HAVE BEEN MOVING AROUND A LOT MORE THAN USUAL: NOT AT ALL
SUM OF ALL RESPONSES TO PHQ QUESTIONS 1-9: 11
4. FEELING TIRED OR HAVING LITTLE ENERGY: SEVERAL DAYS
7. TROUBLE CONCENTRATING ON THINGS, SUCH AS READING THE NEWSPAPER OR WATCHING TELEVISION: NOT AT ALL
1. LITTLE INTEREST OR PLEASURE IN DOING THINGS: SEVERAL DAYS
3. TROUBLE FALLING OR STAYING ASLEEP: MORE THAN HALF THE DAYS
5. POOR APPETITE OR OVEREATING: MORE THAN HALF THE DAYS
6. FEELING BAD ABOUT YOURSELF - OR THAT YOU ARE A FAILURE OR HAVE LET YOURSELF OR YOUR FAMILY DOWN: MORE THAN HALF THE DAYS

## 2024-08-20 NOTE — PROGRESS NOTES
Collaborative Care (CoC)  Progress Note    Type of Interaction: In Office    Start Time: 3:05 PM    End Time: 4:13 PM    Appointment: Scheduled    Reason for Visit:   Chief Complaint   Patient presents with    Follow-up      Interval History / Patient Symptoms:   Subjective   Mercedes Burnham is a 49 y.o. female who presents for follow up for Collaborative Care services.     Patient Health Questionnaire-9 Score: 11 (8/20/2024  3:09 PM)  MICHELLE-7 Total Score: 8 (8/20/2024  3:09 PM)     Interventions Provided: CBT, Solution Focused    Progress Made: Slight    Response to Intervention:  We discussed the following elements from the last appointment:   Son's court appearance was yesterday, but is being continued.  Pt also shared that her son was in the 'hole' for fighting with another inmate.  As patient was discussing this, she noted that her shoulders had tightened up- she reports non-stop worry and concern for her son's overall wellbeing both in [facility] and for his future.  Reports that she knows she can't control him and his actions, but also knows how he is and worries for his mental health.  Not all the time but sometimes can become very all encompassing- all thoughts come rushing and compound at any given time  Pt went back to work, and stated that it has been a positive return- They missed her and recognized what she does when she is at work.      Doesn't notice any change with the dosing of venlafaxine; will give it another 2 weeks and re-evaluate at next appointment.      Lastly, explored patient's feeling of unworthiness and placing blame on herself for her son's mental health and legal issues.  Spent time discussing faulty thoughts and how to challenge these thoughts.  Pt feels it is easier to blame herself than to look at what he has done as his own choices.  Discussed ways that patient can teach her son to advocate for himself- for his therapy, medication needs, instead of relying on her to make the calls, as  "she has done in the past.      Plan:   Positive thinking and challenging faulty thoughts- \"You shouldn't be doing this\" when going down rabbit hole.    Follow Up / Next Appointment: Next appointment: 09/10/24    "

## 2024-08-27 ENCOUNTER — TELEPHONE (OUTPATIENT)
Dept: PRIMARY CARE | Facility: CLINIC | Age: 49
End: 2024-08-27
Payer: COMMERCIAL

## 2024-08-27 NOTE — TELEPHONE ENCOUNTER
Tried calling patient back to speak about medication PA, her voicemail box is full and I am unable to leave a VM- she does not have a my chart either to send message. -- I called her emergency contact, her sister, I let her know we are trying to contact her about some information and please have her call us.       Her insurance is coming up inactive with her PA, I need to know if she has an updated insurance card with ID number or if there is an issue from her going on FMLA.

## 2024-08-30 ENCOUNTER — DOCUMENTATION (OUTPATIENT)
Dept: PRIMARY CARE | Facility: CLINIC | Age: 49
End: 2024-08-30
Payer: COMMERCIAL

## 2024-08-30 DIAGNOSIS — F43.23 ADJUSTMENT DISORDER WITH MIXED ANXIETY AND DEPRESSED MOOD: Primary | ICD-10-CM

## 2024-09-09 ENCOUNTER — APPOINTMENT (OUTPATIENT)
Dept: OBSTETRICS AND GYNECOLOGY | Facility: CLINIC | Age: 49
End: 2024-09-09
Payer: COMMERCIAL

## 2024-09-09 VITALS
HEIGHT: 67 IN | BODY MASS INDEX: 37.2 KG/M2 | WEIGHT: 237 LBS | SYSTOLIC BLOOD PRESSURE: 130 MMHG | DIASTOLIC BLOOD PRESSURE: 72 MMHG

## 2024-09-09 DIAGNOSIS — Z15.09 BRCA2 POSITIVE: ICD-10-CM

## 2024-09-09 DIAGNOSIS — Z15.01 BRCA2 POSITIVE: ICD-10-CM

## 2024-09-09 DIAGNOSIS — Z01.419 ENCOUNTER FOR GYNECOLOGICAL EXAMINATION WITHOUT ABNORMAL FINDING: Primary | ICD-10-CM

## 2024-09-09 DIAGNOSIS — N32.81 OAB (OVERACTIVE BLADDER): ICD-10-CM

## 2024-09-09 PROBLEM — N92.1 MENORRHAGIA WITH IRREGULAR CYCLE: Status: RESOLVED | Noted: 2023-05-19 | Resolved: 2024-09-09

## 2024-09-09 PROCEDURE — 99396 PREV VISIT EST AGE 40-64: CPT | Performed by: OBSTETRICS & GYNECOLOGY

## 2024-09-09 PROCEDURE — 88175 CYTOPATH C/V AUTO FLUID REDO: CPT

## 2024-09-09 PROCEDURE — 3075F SYST BP GE 130 - 139MM HG: CPT | Performed by: OBSTETRICS & GYNECOLOGY

## 2024-09-09 PROCEDURE — 1036F TOBACCO NON-USER: CPT | Performed by: OBSTETRICS & GYNECOLOGY

## 2024-09-09 PROCEDURE — 3008F BODY MASS INDEX DOCD: CPT | Performed by: OBSTETRICS & GYNECOLOGY

## 2024-09-09 PROCEDURE — 87624 HPV HI-RISK TYP POOLED RSLT: CPT

## 2024-09-09 PROCEDURE — 3078F DIAST BP <80 MM HG: CPT | Performed by: OBSTETRICS & GYNECOLOGY

## 2024-09-09 RX ORDER — OXYBUTYNIN CHLORIDE 15 MG/1
15 TABLET, EXTENDED RELEASE ORAL DAILY
Qty: 90 TABLET | Refills: 3 | Status: SHIPPED | OUTPATIENT
Start: 2024-09-09 | End: 2025-09-09

## 2024-09-09 NOTE — PROGRESS NOTES
Bryan Burnham is a 49 y.o. female here for a routine exam.  She has no bleeding, she has a history of ablation in  and no bleeding since.  No menopausal symptoms.  No dysuria, no change in bowel habits, there is occasional constipation.  She is current on her Cologuard from 2023 with her PCP.    A CT scan in May 2021 showed no GYN concerns.  She is BRCA2 positive.    She is due for mammogram, it was last done in 2022.    Personal health questionnaire reviewed: yes.     Gynecologic History  No LMP recorded. Patient has had an ablation.  Contraception: none.  Last Pap: 21. Results were: normal  Last mammogram: 22. Results were: normal    Obstetric History  OB History    Para Term  AB Living   1 1           SAB IAB Ectopic Multiple Live Births                  # Outcome Date GA Lbr Orestes/2nd Weight Sex Type Anes PTL Lv   1 Para                Objective   Constitutional: Alert and in no acute distress. Well developed, well nourished.   Head and Face: Head and face: Normal.    Eyes: Normal external exam - nonicteric sclera, extraocular movements intact (EOMI) and no ptosis.   Neck: No neck asymmetry. Supple. Thyroid not enlarged and there were no palpable thyroid nodules.    Pulmonary: No respiratory distress.   Chest: Breasts: Normal appearance, no nipple discharge and no skin changes. Palpation of breasts and axillae: No palpable mass and no axillary lymphadenopathy.   Abdomen: Soft nontender; no abdominal mass palpated. No organomegaly. No hernias.   Genitourinary: External genitalia: Normal. No inguinal lymphadenopathy. Bartholin's Urethral and Skenes Glands: Normal. Urethra: Normal.  Bladder: Normal on palpation. Vagina: Normal. Cervix: Normal.  Uterus: Normal.  Right Adnexa/parametria: Normal.  Left Adnexa/parametria: Normal.  Inspection of Perianal Area: Normal.   Musculoskeletal: No joint swelling seen, normal movements of all extremities.   Skin:  Normal skin color and pigmentation, normal skin turgor, and no rash.   Neurologic: Non-focal. Grossly intact.   Psychiatric: Alert and oriented x 3. Affect normal to patient baseline. Mood: Appropriate.  Physical Exam     Assessment/Plan   Healthy female exam.  This is a 49-year-old female with a normal exam.  She is BRCA2 positive.  Due to her risk factors, I recommend additional referral to the [facility] breast [facility].  Mammogram was ordered with tomosynthesis.    A Pap smear was sent.    We discussed her refill of oxybutynin ER 15 mg for overactive bladder symptoms.    I will see her routinely in 1 year.  Mammogram ordered.

## 2024-09-10 ENCOUNTER — APPOINTMENT (OUTPATIENT)
Dept: PRIMARY CARE | Facility: CLINIC | Age: 49
End: 2024-09-10
Payer: COMMERCIAL

## 2024-09-11 ENCOUNTER — HOSPITAL ENCOUNTER (OUTPATIENT)
Dept: RADIOLOGY | Facility: CLINIC | Age: 49
Discharge: HOME | End: 2024-09-11
Payer: COMMERCIAL

## 2024-09-11 VITALS — HEIGHT: 67 IN | WEIGHT: 238.1 LBS | BODY MASS INDEX: 37.37 KG/M2

## 2024-09-11 DIAGNOSIS — Z01.419 ENCOUNTER FOR GYNECOLOGICAL EXAMINATION WITHOUT ABNORMAL FINDING: ICD-10-CM

## 2024-09-11 PROCEDURE — 77067 SCR MAMMO BI INCL CAD: CPT

## 2024-09-18 LAB
CYTOLOGY CMNT CVX/VAG CYTO-IMP: NORMAL
HPV HR 12 DNA GENITAL QL NAA+PROBE: NEGATIVE
HPV HR GENOTYPES PNL CVX NAA+PROBE: NEGATIVE
HPV16 DNA SPEC QL NAA+PROBE: NEGATIVE
HPV18 DNA SPEC QL NAA+PROBE: NEGATIVE
LAB AP HISTORY OF MALIGNANCY: NORMAL
LAB AP HPV GENOTYPE QUESTION: YES
LAB AP HPV HR: NORMAL
LABORATORY COMMENT REPORT: NORMAL
MENSTRUAL HX REPORTED: NORMAL
PATH REPORT.TOTAL CANCER: NORMAL

## 2024-09-25 ENCOUNTER — APPOINTMENT (OUTPATIENT)
Dept: PRIMARY CARE | Facility: CLINIC | Age: 49
End: 2024-09-25
Payer: COMMERCIAL

## 2024-09-30 ENCOUNTER — TELEPHONE (OUTPATIENT)
Dept: PRIMARY CARE | Facility: CLINIC | Age: 49
End: 2024-09-30
Payer: COMMERCIAL

## 2024-09-30 DIAGNOSIS — E66.812 CLASS 2 SEVERE OBESITY DUE TO EXCESS CALORIES WITH SERIOUS COMORBIDITY AND BODY MASS INDEX (BMI) OF 36.0 TO 36.9 IN ADULT: Primary | ICD-10-CM

## 2024-09-30 DIAGNOSIS — E66.01 CLASS 2 SEVERE OBESITY DUE TO EXCESS CALORIES WITH SERIOUS COMORBIDITY AND BODY MASS INDEX (BMI) OF 36.0 TO 36.9 IN ADULT: Primary | ICD-10-CM

## 2024-09-30 NOTE — TELEPHONE ENCOUNTER
Spoke with patient's insurance company and they are stating that since she does not have T2DM that she has no coverage for weight loss in her plan. Can we send to Nancy?

## 2024-10-08 DIAGNOSIS — E66.01 CLASS 2 SEVERE OBESITY DUE TO EXCESS CALORIES WITH SERIOUS COMORBIDITY AND BODY MASS INDEX (BMI) OF 36.0 TO 36.9 IN ADULT: Primary | ICD-10-CM

## 2024-10-08 DIAGNOSIS — E66.812 CLASS 2 SEVERE OBESITY DUE TO EXCESS CALORIES WITH SERIOUS COMORBIDITY AND BODY MASS INDEX (BMI) OF 36.0 TO 36.9 IN ADULT: Primary | ICD-10-CM

## 2024-10-10 ENCOUNTER — TELEPHONE (OUTPATIENT)
Dept: PRIMARY CARE | Facility: CLINIC | Age: 49
End: 2024-10-10
Payer: COMMERCIAL

## 2024-10-10 NOTE — PROGRESS NOTES
M attempted to contact patient to reschedule appointment and to follow up on mental health care, medication, symptom management, and general wellbeing.  Unable to reach the patient and left a message requesting return phone call.

## 2024-10-25 ENCOUNTER — TELEPHONE (OUTPATIENT)
Dept: PRIMARY CARE | Facility: CLINIC | Age: 49
End: 2024-10-25
Payer: COMMERCIAL

## 2024-10-25 DIAGNOSIS — F32.A ANXIETY AND DEPRESSION: ICD-10-CM

## 2024-10-25 DIAGNOSIS — F41.1 ANXIETY, GENERALIZED: ICD-10-CM

## 2024-10-25 DIAGNOSIS — F41.9 ANXIETY AND DEPRESSION: ICD-10-CM

## 2024-10-25 RX ORDER — VENLAFAXINE HYDROCHLORIDE 150 MG/1
CAPSULE, EXTENDED RELEASE ORAL
Qty: 90 CAPSULE | Refills: 1 | Status: SHIPPED | OUTPATIENT
Start: 2024-10-25

## 2024-10-25 RX ORDER — VENLAFAXINE HYDROCHLORIDE 75 MG/1
75 CAPSULE, EXTENDED RELEASE ORAL DAILY
Qty: 90 CAPSULE | Refills: 1 | Status: SHIPPED | OUTPATIENT
Start: 2024-10-25

## 2024-10-25 NOTE — TELEPHONE ENCOUNTER
Patient is needing a refill on her Effexor ER,    She is asking for the dosage of 225 so she can take 1 pill instead of 2 pills, I looked in our database and did not see that that was a dose we could order ,please advise so I can let patient know.

## 2024-10-28 DIAGNOSIS — F32.A ANXIETY AND DEPRESSION: ICD-10-CM

## 2024-10-28 DIAGNOSIS — F41.9 ANXIETY AND DEPRESSION: ICD-10-CM

## 2024-10-28 DIAGNOSIS — F41.1 ANXIETY, GENERALIZED: ICD-10-CM

## 2024-11-08 RX ORDER — VENLAFAXINE HYDROCHLORIDE 150 MG/1
CAPSULE, EXTENDED RELEASE ORAL
Qty: 90 CAPSULE | Refills: 0 | OUTPATIENT
Start: 2024-11-08

## 2024-11-11 ENCOUNTER — TELEPHONE (OUTPATIENT)
Dept: PRIMARY CARE | Facility: CLINIC | Age: 49
End: 2024-11-11
Payer: COMMERCIAL

## 2024-11-12 RX ORDER — VENLAFAXINE HYDROCHLORIDE 150 MG/1
CAPSULE, EXTENDED RELEASE ORAL
Qty: 90 CAPSULE | Refills: 1 | OUTPATIENT
Start: 2024-11-12

## 2024-11-21 ENCOUNTER — DOCUMENTATION (OUTPATIENT)
Dept: PRIMARY CARE | Facility: CLINIC | Age: 49
End: 2024-11-21
Payer: COMMERCIAL

## 2024-11-21 DIAGNOSIS — F43.23 ADJUSTMENT DISORDER WITH MIXED ANXIETY AND DEPRESSED MOOD: Primary | ICD-10-CM

## 2024-11-21 NOTE — PROGRESS NOTES
This patient has been enrolled in [facility] for management and support of depression and/or anxiety.  [facility] has attempted to reach patient on several occasions without success.  At this time, patient has not contacted [facility], thus [facility] will discharge patient from [facility] and end episode of care.  If PCP feels patient would benefit from [facility] in the future, a new referral can be placed.

## 2025-02-10 DIAGNOSIS — F41.1 ANXIETY, GENERALIZED: ICD-10-CM

## 2025-02-10 RX ORDER — PROPRANOLOL HYDROCHLORIDE 20 MG/1
20 TABLET ORAL 2 TIMES DAILY
Qty: 60 TABLET | Refills: 0 | Status: SHIPPED | OUTPATIENT
Start: 2025-02-10

## 2025-02-10 NOTE — TELEPHONE ENCOUNTER
Follow-up visit: none - due Nov 2024  Last visit: 8/6/24 rtc in 3 months  Last vitals:   -9/9/24 (/72)  -8/6/24 (/70, HR 75)  MICHELLE-7 score 10: 8/6/24    Will send in 1 month supply of propranolol 20mg BID for anxiety but patient must schedule follow-up for further refills. Rx was last discussed on 8/1/24 with MARIA G and first prescribed on 7/20/24. Based on gaps in dispense hx, patient doesn't seem to take it BID for anxiety

## 2025-03-06 DIAGNOSIS — F41.1 ANXIETY, GENERALIZED: ICD-10-CM

## 2025-03-07 RX ORDER — PROPRANOLOL HYDROCHLORIDE 20 MG/1
20 TABLET ORAL 2 TIMES DAILY
Qty: 60 TABLET | Refills: 0 | Status: SHIPPED | OUTPATIENT
Start: 2025-03-07

## 2025-03-07 NOTE — TELEPHONE ENCOUNTER
Follow-up visit: none - due Nov 2024  Last visit: 8/6/24 rtc in 3 months  Last vitals:   -9/9/24 (/72)  -8/6/24 (/70, HR 75)  MICHELLE-7 score 10: 8/6/24     Uncertain if contact attempt was made so will send in another 30 day supply of propranolol 20mg BID for anxiety but patient must schedule follow-up for further refills. Rx was last discussed on 8/1/24 with MARIA G and first prescribed on 7/20/24. Based on gaps in dispense hx, patient doesn't seem to take it BID for anxiety

## 2025-05-02 DIAGNOSIS — F41.9 ANXIETY AND DEPRESSION: ICD-10-CM

## 2025-05-02 DIAGNOSIS — N32.81 OAB (OVERACTIVE BLADDER): ICD-10-CM

## 2025-05-02 DIAGNOSIS — F32.A ANXIETY AND DEPRESSION: ICD-10-CM

## 2025-05-02 DIAGNOSIS — F41.1 ANXIETY, GENERALIZED: ICD-10-CM

## 2025-05-02 NOTE — TELEPHONE ENCOUNTER
Patient call requesting a short supply that can sustain her until her next vst.      Medication Name:venlafaxine XR (Effexor-XR)  Dose:150 mg   Frequency: Take with 75 mg dose - total daily dose 225 mg  Quantity left:0    Medication Name:venlafaxine XR (Effexor-XR)  Dose:75 mg   Frequency:Take 1 capsule (75 mg) by mouth once daily. Take with 150 mg - total daily dose 225mg  Quantity left:0      Pharmacy:[facility] - [address]  [id number] [place], [place] [id number]        Last appointment:8/6/2024  Last CPE:8/6/2024  Last MCW: N/A  Next appointment:6/19/2025   Next CPE:N/A  Next MCW:

## 2025-05-04 RX ORDER — OXYBUTYNIN CHLORIDE 15 MG/1
15 TABLET, EXTENDED RELEASE ORAL DAILY
Qty: 60 TABLET | Refills: 0 | Status: SHIPPED | OUTPATIENT
Start: 2025-05-04 | End: 2025-07-03

## 2025-05-04 RX ORDER — VENLAFAXINE HYDROCHLORIDE 150 MG/1
CAPSULE, EXTENDED RELEASE ORAL
Qty: 60 CAPSULE | Refills: 0 | Status: SHIPPED | OUTPATIENT
Start: 2025-05-04

## 2025-05-05 DIAGNOSIS — F41.1 ANXIETY, GENERALIZED: ICD-10-CM

## 2025-05-05 DIAGNOSIS — F32.A ANXIETY AND DEPRESSION: ICD-10-CM

## 2025-05-05 DIAGNOSIS — F41.9 ANXIETY AND DEPRESSION: ICD-10-CM

## 2025-05-05 RX ORDER — VENLAFAXINE HYDROCHLORIDE 150 MG/1
CAPSULE, EXTENDED RELEASE ORAL
Qty: 90 CAPSULE | Refills: 0 | OUTPATIENT
Start: 2025-05-05

## 2025-05-05 NOTE — TELEPHONE ENCOUNTER
Follow-up visit: none scheduled  Last visit: 24  Last labs: LDL 76 (2024)  Last vitals: /72 (2024)  Depression screenin (2024)    Refill request from [facility] for the following:   - Venlafaxine ER 75mg every day. Take with 150mg

## 2025-05-06 RX ORDER — VENLAFAXINE HYDROCHLORIDE 75 MG/1
75 CAPSULE, EXTENDED RELEASE ORAL DAILY
Qty: 60 CAPSULE | Refills: 0 | Status: SHIPPED | OUTPATIENT
Start: 2025-05-06

## 2025-06-19 ENCOUNTER — APPOINTMENT (OUTPATIENT)
Dept: PRIMARY CARE | Facility: CLINIC | Age: 50
End: 2025-06-19
Payer: COMMERCIAL

## 2025-06-19 VITALS
HEART RATE: 64 BPM | BODY MASS INDEX: 32.43 KG/M2 | DIASTOLIC BLOOD PRESSURE: 80 MMHG | HEIGHT: 67 IN | WEIGHT: 206.6 LBS | OXYGEN SATURATION: 95 % | SYSTOLIC BLOOD PRESSURE: 139 MMHG

## 2025-06-19 DIAGNOSIS — F41.9 ANXIETY AND DEPRESSION: ICD-10-CM

## 2025-06-19 DIAGNOSIS — Z13.6 SCREENING FOR CARDIOVASCULAR CONDITION: ICD-10-CM

## 2025-06-19 DIAGNOSIS — R79.89 ELEVATED PARATHYROID HORMONE: ICD-10-CM

## 2025-06-19 DIAGNOSIS — E55.9 VITAMIN D DEFICIENCY: ICD-10-CM

## 2025-06-19 DIAGNOSIS — D64.9 ANEMIA, UNSPECIFIED TYPE: ICD-10-CM

## 2025-06-19 DIAGNOSIS — F41.1 ANXIETY, GENERALIZED: ICD-10-CM

## 2025-06-19 DIAGNOSIS — E66.811 CLASS 1 OBESITY DUE TO EXCESS CALORIES WITH BODY MASS INDEX (BMI) OF 32.0 TO 32.9 IN ADULT, UNSPECIFIED WHETHER SERIOUS COMORBIDITY PRESENT: ICD-10-CM

## 2025-06-19 DIAGNOSIS — G47.33 OBSTRUCTIVE SLEEP APNEA: ICD-10-CM

## 2025-06-19 DIAGNOSIS — Z13.0 SCREENING FOR DISORDER OF BLOOD AND BLOOD-FORMING ORGANS: ICD-10-CM

## 2025-06-19 DIAGNOSIS — F32.A ANXIETY AND DEPRESSION: ICD-10-CM

## 2025-06-19 DIAGNOSIS — Z11.59 NEED FOR HEPATITIS C SCREENING TEST: ICD-10-CM

## 2025-06-19 DIAGNOSIS — Z13.29 THYROID DISORDER SCREENING: ICD-10-CM

## 2025-06-19 DIAGNOSIS — E88.810 DYSMETABOLIC SYNDROME X: ICD-10-CM

## 2025-06-19 DIAGNOSIS — D50.8 IRON DEFICIENCY ANEMIA SECONDARY TO INADEQUATE DIETARY IRON INTAKE: ICD-10-CM

## 2025-06-19 DIAGNOSIS — E66.09 CLASS 1 OBESITY DUE TO EXCESS CALORIES WITH BODY MASS INDEX (BMI) OF 32.0 TO 32.9 IN ADULT, UNSPECIFIED WHETHER SERIOUS COMORBIDITY PRESENT: ICD-10-CM

## 2025-06-19 DIAGNOSIS — N32.81 OAB (OVERACTIVE BLADDER): ICD-10-CM

## 2025-06-19 DIAGNOSIS — Z00.00 ANNUAL PHYSICAL EXAM: Primary | ICD-10-CM

## 2025-06-19 RX ORDER — VENLAFAXINE HYDROCHLORIDE 225 MG/1
225 TABLET, EXTENDED RELEASE ORAL DAILY
Qty: 90 TABLET | Refills: 3 | Status: SHIPPED | OUTPATIENT
Start: 2025-06-19

## 2025-06-19 RX ORDER — OXYBUTYNIN CHLORIDE 15 MG/1
15 TABLET, EXTENDED RELEASE ORAL DAILY
Qty: 90 TABLET | Refills: 3 | Status: SHIPPED | OUTPATIENT
Start: 2025-06-19

## 2025-06-19 RX ORDER — TIRZEPATIDE 2.5 MG/.5ML
2.5 INJECTION, SOLUTION SUBCUTANEOUS WEEKLY
Qty: 2 ML | Refills: 0 | Status: SHIPPED | OUTPATIENT
Start: 2025-06-19 | End: 2025-07-19

## 2025-06-19 RX ORDER — BUSPIRONE HYDROCHLORIDE 10 MG/1
10 TABLET ORAL 3 TIMES DAILY
Qty: 90 TABLET | Refills: 5 | Status: SHIPPED | OUTPATIENT
Start: 2025-06-19 | End: 2026-06-19

## 2025-06-19 ASSESSMENT — ANXIETY QUESTIONNAIRES
2. NOT BEING ABLE TO STOP OR CONTROL WORRYING: NOT AT ALL
1. FEELING NERVOUS, ANXIOUS, OR ON EDGE: SEVERAL DAYS
IF YOU CHECKED OFF ANY PROBLEMS ON THIS QUESTIONNAIRE, HOW DIFFICULT HAVE THESE PROBLEMS MADE IT FOR YOU TO DO YOUR WORK, TAKE CARE OF THINGS AT HOME, OR GET ALONG WITH OTHER PEOPLE: NOT DIFFICULT AT ALL
6. BECOMING EASILY ANNOYED OR IRRITABLE: NOT AT ALL
3. WORRYING TOO MUCH ABOUT DIFFERENT THINGS: NOT AT ALL
GAD7 TOTAL SCORE: 1
7. FEELING AFRAID AS IF SOMETHING AWFUL MIGHT HAPPEN: NOT AT ALL
5. BEING SO RESTLESS THAT IT IS HARD TO SIT STILL: NOT AT ALL
4. TROUBLE RELAXING: NOT AT ALL

## 2025-06-19 ASSESSMENT — PATIENT HEALTH QUESTIONNAIRE - PHQ9
8. MOVING OR SPEAKING SO SLOWLY THAT OTHER PEOPLE COULD HAVE NOTICED. OR THE OPPOSITE, BEING SO FIGETY OR RESTLESS THAT YOU HAVE BEEN MOVING AROUND A LOT MORE THAN USUAL: NOT AT ALL
SUM OF ALL RESPONSES TO PHQ QUESTIONS 1-9: 5
4. FEELING TIRED OR HAVING LITTLE ENERGY: SEVERAL DAYS
SUM OF ALL RESPONSES TO PHQ9 QUESTIONS 1 AND 2: 1
3. TROUBLE FALLING OR STAYING ASLEEP OR SLEEPING TOO MUCH: SEVERAL DAYS
5. POOR APPETITE OR OVEREATING: NOT AT ALL
1. LITTLE INTEREST OR PLEASURE IN DOING THINGS: NOT AT ALL
2. FEELING DOWN, DEPRESSED OR HOPELESS: SEVERAL DAYS
7. TROUBLE CONCENTRATING ON THINGS, SUCH AS READING THE NEWSPAPER OR WATCHING TELEVISION: NOT AT ALL
6. FEELING BAD ABOUT YOURSELF - OR THAT YOU ARE A FAILURE OR HAVE LET YOURSELF OR YOUR FAMILY DOWN: MORE THAN HALF THE DAYS
9. THOUGHTS THAT YOU WOULD BE BETTER OFF DEAD, OR OF HURTING YOURSELF: NOT AT ALL

## 2025-06-19 ASSESSMENT — LIFESTYLE VARIABLES
SKIP TO QUESTIONS 9-10: 1
HOW OFTEN DO YOU HAVE SIX OR MORE DRINKS ON ONE OCCASION: NEVER
HOW MANY STANDARD DRINKS CONTAINING ALCOHOL DO YOU HAVE ON A TYPICAL DAY: 1 OR 2
HOW OFTEN DO YOU HAVE A DRINK CONTAINING ALCOHOL: MONTHLY OR LESS
AUDIT-C TOTAL SCORE: 1

## 2025-06-19 NOTE — ASSESSMENT & PLAN NOTE
Orders:    Referral to Clinical Pharmacy; Future    tirzepatide (Mounjaro) 2.5 mg/0.5 mL pen injector; Inject 2.5 mg under the skin 1 (one) time per week.

## 2025-06-19 NOTE — ASSESSMENT & PLAN NOTE
Orders:    venlafaxine 225 mg 24 hr tablet; Take 1 tablet (225 mg) by mouth once daily. Do not crush, chew, or split.    busPIRone (Buspar) 10 mg tablet; Take 1 tablet (10 mg) by mouth 3 times a day.

## 2025-06-19 NOTE — PROGRESS NOTES
Subjective   HPI   History of Present Illness   Mercedes Burnham is a 50-year-old female who presents for follow-up of medication refills.    Medication Refills  She continues her regimen of venlafaxine but has exhausted her supply of BuSpar, which she typically takes in the morning. She has also run out of propranolol, a medication she uses situationally, such as during court appearances or discussions with her . She is uncertain about the necessity of continuing this medication.    Difficulty Maintaining Adequate Protein Levels  She has been experiencing difficulty in maintaining adequate protein levels in her body, as evidenced by low protein levels in her recent blood work. Despite consuming protein shakes and bars twice daily, she struggles to retain protein. She donates plasma biweekly, which may contribute to her low protein levels. Her protein level was recorded at 5.4 on 06/12/2025, necessitating a one-week hiatus from plasma donation. This pattern of low protein levels occurs monthly. She has a history of anemia, with consistently low hemoglobin levels, and takes an iron supplement daily.  - Onset: Difficulty maintaining protein levels noted in recent blood work.  - Duration: Ongoing issue with monthly patterns of low protein levels.  - Character: Low protein levels despite consuming protein shakes and bars twice daily.  - Alleviating/Aggravating Factors: Plasma donation biweekly may contribute to low protein levels; takes iron supplement daily for anemia.  - Timing: Protein level recorded at 5.4 on 06/12/2025; one-week hiatus from plasma donation.    Weight Loss and Dietary Habits  She has been receiving Mounjaro injections since either February or March 2025, resulting in a weight loss of approximately 30 pounds. She gained weight post-surgery but believes her stomach has since shrunk. She has increased her protein intake and avoids snacking. She does not currently work with a dietitian but has  "done so briefly in the past. She is a selective eater, with a preference for meat over vegetables.  - Onset: Weight loss since starting Mounjaro injections in February or March 2025.  - Duration: Weight loss of approximately 30 pounds.  - Character: Increased protein intake and avoidance of snacking; selective eater with preference for meat over vegetables.  - Severity: Significant weight loss of approximately 30 pounds.    Bladder Issues  She is on oxybutynin once a day for bladder issues, which is helping her.     ANXIETY & DEPRESSION: stressors continue at home  Her son's court date is coming up     PHQ-9: 1  MICHELLE-7 Total Score: 1   MICHELLE-7 Total Score: 1   INSULIN RESISTANCE:  S/p Bariatric procedure 6/2019  Has been stable in weight - initially lost 30 pound of Mounjuaro compounded    S/p bariatric procedure - previously followed by bariatric team and dietician    Review of Systems  Review of Systems negative except as noted in HPI and Chief complaint.     Objective     VITALS:  /80   Pulse 64   Ht 1.702 m (5' 7\")   Wt 93.7 kg (206 lb 9.6 oz)   SpO2 95%   BMI 32.36 kg/m²     Physical Exam    Assessment & Plan  Annual physical exam    Orders:    CBC; Future    Comprehensive Metabolic Panel; Future    Lipid Panel; Future    TSH with reflex to Free T4 if abnormal; Future    Screening for disorder of blood and blood-forming organs    Orders:    CBC; Future    Screening for cardiovascular condition    Orders:    Lipid Panel; Future    Thyroid disorder screening    Orders:    TSH with reflex to Free T4 if abnormal; Future    Vitamin D deficiency    Orders:    Vitamin D 25-Hydroxy,Total (for eval of Vitamin D levels); Future    Need for hepatitis C screening test    Orders:    Hepatitis C antibody; Future    Elevated parathyroid hormone    Orders:    PTH, intact; Future    Dysmetabolic syndrome X    Orders:    Albumin-Creatinine Ratio, Urine Random; Future    Hemoglobin A1C; Future    Anxiety and " depression    Orders:    venlafaxine 225 mg 24 hr tablet; Take 1 tablet (225 mg) by mouth once daily. Do not crush, chew, or split.    busPIRone (Buspar) 10 mg tablet; Take 1 tablet (10 mg) by mouth 3 times a day.    Anxiety, generalized    Orders:    busPIRone (Buspar) 10 mg tablet; Take 1 tablet (10 mg) by mouth 3 times a day.    OAB (overactive bladder)    Orders:    oxyBUTYnin XL (Ditropan-XL) 15 mg 24 hr tablet; Take 1 tablet (15 mg) by mouth once daily. Do not crush, chew, or split.    Anemia, unspecified type         Iron deficiency anemia secondary to inadequate dietary iron intake    Orders:    Iron and TIBC; Future    Ferritin; Future    Transferrin; Future    Class 1 obesity due to excess calories with body mass index (BMI) of 32.0 to 32.9 in adult, unspecified whether serious comorbidity present    Orders:    Referral to Clinical Pharmacy; Future    tirzepatide (Mounjaro) 2.5 mg/0.5 mL pen injector; Inject 2.5 mg under the skin 1 (one) time per week.    Obstructive sleep apnea    Orders:    tirzepatide (Mounjaro) 2.5 mg/0.5 mL pen injector; Inject 2.5 mg under the skin 1 (one) time per week.         Assessment & Plan  1. Medication refills: Stable.  - Venlafaxine 225 mg will be consolidated into a single prescription to avoid multiple co-pays.  - BuSpar will be provided, which can be taken once or twice daily as needed.  - Propranolol will be prescribed for situational use, such as during court appearances or discussions with her , to manage symptoms like racing heart and flushing. Advised to take propranolol regularly, once or twice daily, in conjunction with venlafaxine for optimal results.    2. Low protein levels: Chronic.  - Low protein levels may be due to frequent plasma donation and reduced absorption capacity following surgery. Blood work will be ordered to monitor protein levels and overall health status. Reports difficulty maintaining protein levels despite dietary efforts. Discussed  potential impact of plasma donation and reduced absorption post-surgery on protein levels.    3. Weight management: Stable.  - Prior authorization for Mounjaro will be initiated, and Nancy from the pharmacy will contact regarding potential programs or discounts. Advised to continue current weight loss regimen and consider working with a dietitian for better dietary management. Lost almost 30 pounds since starting the weight loss medication. Discussed potential insurance coverage and cost-saving programs for weight loss medication.    4. Bladder issues: Stable.  - On oxybutynin once a day for bladder issues, which is helping. Confirms effectiveness of the medication. No changes to the current dosage of oxybutynin. Medication will be continued as prescribed.    Follow-up  - Blood work orders will be placed to recheck everything before the physical in August.         FOLLOW UP 6 MONTHS for CPE - labs ordered to be drawn prior to that appointment.    Ctaa Jaime DO 06/28/25 5:09 PM    This medical note was created with the assistance of artificial intelligence (AI) for documentation purposes. The content has been reviewed and confirmed by the healthcare provider for accuracy and completeness. Patient consented to the use of audio recording and use of AI during their visit.

## 2025-06-19 NOTE — ASSESSMENT & PLAN NOTE
Orders:    oxyBUTYnin XL (Ditropan-XL) 15 mg 24 hr tablet; Take 1 tablet (15 mg) by mouth once daily. Do not crush, chew, or split.

## 2025-06-25 ENCOUNTER — TELEMEDICINE (OUTPATIENT)
Dept: PHARMACY | Facility: HOSPITAL | Age: 50
End: 2025-06-25
Payer: COMMERCIAL

## 2025-06-25 DIAGNOSIS — E66.09 CLASS 1 OBESITY DUE TO EXCESS CALORIES WITH BODY MASS INDEX (BMI) OF 32.0 TO 32.9 IN ADULT, UNSPECIFIED WHETHER SERIOUS COMORBIDITY PRESENT: ICD-10-CM

## 2025-06-25 DIAGNOSIS — E66.811 CLASS 1 OBESITY DUE TO EXCESS CALORIES WITH BODY MASS INDEX (BMI) OF 32.0 TO 32.9 IN ADULT, UNSPECIFIED WHETHER SERIOUS COMORBIDITY PRESENT: ICD-10-CM

## 2025-06-27 NOTE — PROGRESS NOTES
Clinical Pharmacy Appointment    Patient ID: Mercedes Burnham is a 50 y.o. female who presents for Obesity.    Pt is here for First appointment. Patient was referred to pharmacy for GLP1 coverage/cost assistance.     Referring Provider: Cata Jaime DO  PCP: Cata Jaime DO  Last visit with PCP: 6/19/25   Next visit with PCP: 12/22/25    Subjective     Current Weight Loss Medication  Compounded tirzepatide  Dose unknown but patient has been taking for ~4 months now and has been consistently titrating so likely close to 7.5 mg once weekly  Patient has lost ~ 30 lb since starting treatment   Tolerating medication well   Paying $100/week     Pertinent Past Medical History  Denies history of Type II DM  Bariatric surgery 5 years ago   Confirms sleep apnea diagnosis   Denies history of MI/stroke/stents    DISCUSSION  Test claims run through insurance shows that Zepbound and Wegovy are NOT covered at this time   Made patient aware of coupon prices of ~$500/month which is more than what she is currently paying for   Test claim run through insurance shows that Mounjaro requires a prior authorization   Likely will not be approved unless patient has diagnosis for type II dm but submit just in case   Patient has been working on her diet  Focusing on consuming 100-120 g of protein daily   Patient has no further questions or concerns       Objective   Allergies[1]  Social History     Social History Narrative    Not on file      Medication Review  Current Outpatient Medications   Medication Instructions    busPIRone (BUSPAR) 10 mg, oral, 3 times daily    Mounjaro 2.5 mg, subcutaneous, Weekly    oxyBUTYnin XL (DITROPAN-XL) 15 mg, oral, Daily, Do not crush, chew, or split.    propranolol (INDERAL) 20 mg, oral, 2 times daily, Please schedule appointment for further refills    tirzepatide (MOUNJARO SUBQ) Inject under the skin.    venlafaxine 225 mg, oral, Daily, Do not crush, chew, or split.      Vitals  BP Readings from  "Last 2 Encounters:   06/19/25 139/80   09/09/24 130/72     BMI Readings from Last 1 Encounters:   06/19/25 32.36 kg/m²      Labs  A1C  Lab Results   Component Value Date    HGBA1C 5.2 08/02/2023    HGBA1C 6.2 03/04/2019     BMP  Lab Results   Component Value Date    CALCIUM 8.7 08/06/2024     08/06/2024    K 4.5 08/06/2024    CO2 29 08/06/2024     08/06/2024    BUN 16 08/06/2024    CREATININE 1.07 (H) 08/06/2024    EGFR 63 08/06/2024     LFTs  Lab Results   Component Value Date    ALT 38 08/06/2024    AST 52 (H) 08/06/2024    ALKPHOS 86 08/06/2024    BILITOT 0.3 08/06/2024     FLP  Lab Results   Component Value Date    TRIG 96 07/20/2024    CHOL 161 07/20/2024    LDLF 58 08/02/2023    LDLCALC 76 07/20/2024    HDL 65.8 07/20/2024     Urine Microalbumin  No results found for: \"MICROALBCREA\"  Weight Management  Wt Readings from Last 3 Encounters:   06/19/25 93.7 kg (206 lb 9.6 oz)   09/11/24 108 kg (238 lb 1.6 oz)   09/09/24 108 kg (237 lb)      There is no height or weight on file to calculate BMI.     Assessment/Plan   Problem List Items Addressed This Visit       Obesity    Will submit prior authorization for Mounjaro            Clinical Pharmacist follow-up: 7/2 to check on PA status     Continue all meds under the continuation of care with the referring provider and clinical pharmacy team.           [1] No Known Allergies    "

## 2025-07-02 ENCOUNTER — APPOINTMENT (OUTPATIENT)
Dept: PHARMACY | Facility: HOSPITAL | Age: 50
End: 2025-07-02
Payer: COMMERCIAL

## 2025-07-02 DIAGNOSIS — E66.09 CLASS 1 OBESITY DUE TO EXCESS CALORIES WITH BODY MASS INDEX (BMI) OF 32.0 TO 32.9 IN ADULT, UNSPECIFIED WHETHER SERIOUS COMORBIDITY PRESENT: ICD-10-CM

## 2025-07-02 DIAGNOSIS — E66.811 CLASS 1 OBESITY DUE TO EXCESS CALORIES WITH BODY MASS INDEX (BMI) OF 32.0 TO 32.9 IN ADULT, UNSPECIFIED WHETHER SERIOUS COMORBIDITY PRESENT: ICD-10-CM

## 2025-07-07 NOTE — ASSESSMENT & PLAN NOTE
CONTINUE current tirzepatide regimen  Patient to contact if interested in switching to Zepbound with [facility]

## 2025-07-07 NOTE — PROGRESS NOTES
Clinical Pharmacy Appointment    Patient ID: Mercedes Burnham is a 50 y.o. female who presents for Obesity.    Pt is here for Follow Up appointment. Patient was referred to pharmacy for GLP1 coverage/cost assistance.  At last visit, Mounjaro prior authorization was submitted.      Referring Provider: Cata Jaime DO  PCP: Cata Jaime DO  Last visit with PCP: 6/19/25   Next visit with PCP: 12/22/25    Subjective   Current Weight Loss Medication  Compounded tirzepatide  Dose unknown but patient has been taking for ~4 months now and has been consistently titrating so likely close to 7.5 mg once weekly  Patient has lost ~ 30 lb since starting treatment   Tolerating medication well   Paying $100/week      Pertinent Past Medical History  Denies history of Type II DM  Bariatric surgery 5 years ago   Confirms sleep apnea diagnosis   Denies history of MI/stroke/stents     DISCUSSION  Mounjaro prior authorization was DENIED  Only covered with Type II DM diagnosis   Reviewed Zepbound coupon prices and patient is currently paying less for compounded tirzepatide  Patient elects to stay on her current regiment   Patient has no further questions or concerns     Objective   Allergies[1]  Social History     Social History Narrative    Not on file      Medication Review  Current Outpatient Medications   Medication Instructions    busPIRone (BUSPAR) 10 mg, oral, 3 times daily    Mounjaro 2.5 mg, subcutaneous, Weekly    oxyBUTYnin XL (DITROPAN-XL) 15 mg, oral, Daily, Do not crush, chew, or split.    propranolol (INDERAL) 20 mg, oral, 2 times daily, Please schedule appointment for further refills    tirzepatide (MOUNJARO SUBQ) Inject under the skin.    venlafaxine 225 mg, oral, Daily, Do not crush, chew, or split.      Vitals  BP Readings from Last 2 Encounters:   06/19/25 139/80   09/09/24 130/72     BMI Readings from Last 1 Encounters:   06/19/25 32.36 kg/m²      Labs  A1C  Lab Results   Component Value Date    HGBA1C 5.2  "08/02/2023    HGBA1C 6.2 03/04/2019     BMP  Lab Results   Component Value Date    CALCIUM 8.7 08/06/2024     08/06/2024    K 4.5 08/06/2024    CO2 29 08/06/2024     08/06/2024    BUN 16 08/06/2024    CREATININE 1.07 (H) 08/06/2024    EGFR 63 08/06/2024     LFTs  Lab Results   Component Value Date    ALT 38 08/06/2024    AST 52 (H) 08/06/2024    ALKPHOS 86 08/06/2024    BILITOT 0.3 08/06/2024     FLP  Lab Results   Component Value Date    TRIG 96 07/20/2024    CHOL 161 07/20/2024    LDLF 58 08/02/2023    LDLCALC 76 07/20/2024    HDL 65.8 07/20/2024     Urine Microalbumin  No results found for: \"MICROALBCREA\"  Weight Management  Wt Readings from Last 3 Encounters:   06/19/25 93.7 kg (206 lb 9.6 oz)   09/11/24 108 kg (238 lb 1.6 oz)   09/09/24 108 kg (237 lb)      There is no height or weight on file to calculate BMI.     Assessment/Plan   Problem List Items Addressed This Visit       Obesity    CONTINUE current tirzepatide regimen  Patient to contact if interested in switching to Zepbound with [facility]             Clinical Pharmacist follow-up: n/a    Continue all meds under the continuation of care with the referring provider and clinical pharmacy team.           [1] No Known Allergies    "

## 2025-08-05 ENCOUNTER — OFFICE VISIT (OUTPATIENT)
Dept: URGENT CARE | Age: 50
End: 2025-08-05
Payer: COMMERCIAL

## 2025-08-05 ENCOUNTER — APPOINTMENT (OUTPATIENT)
Dept: ORTHOPEDIC SURGERY | Facility: CLINIC | Age: 50
End: 2025-08-05
Payer: COMMERCIAL

## 2025-08-05 ENCOUNTER — ANCILLARY PROCEDURE (OUTPATIENT)
Dept: URGENT CARE | Age: 50
End: 2025-08-05
Payer: COMMERCIAL

## 2025-08-05 VITALS
OXYGEN SATURATION: 99 % | HEIGHT: 67 IN | RESPIRATION RATE: 18 BRPM | BODY MASS INDEX: 33.12 KG/M2 | TEMPERATURE: 98.2 F | HEART RATE: 57 BPM | WEIGHT: 211 LBS | DIASTOLIC BLOOD PRESSURE: 80 MMHG | SYSTOLIC BLOOD PRESSURE: 133 MMHG

## 2025-08-05 DIAGNOSIS — S69.92XA INJURY OF LEFT HAND, INITIAL ENCOUNTER: ICD-10-CM

## 2025-08-05 DIAGNOSIS — S62.92XA CLOSED FRACTURE OF LEFT HAND, INITIAL ENCOUNTER: Primary | ICD-10-CM

## 2025-08-05 PROCEDURE — 3008F BODY MASS INDEX DOCD: CPT | Performed by: PERSONAL EMERGENCY RESPONSE ATTENDANT

## 2025-08-05 PROCEDURE — 99203 OFFICE O/P NEW LOW 30 MIN: CPT | Performed by: PERSONAL EMERGENCY RESPONSE ATTENDANT

## 2025-08-05 PROCEDURE — 3079F DIAST BP 80-89 MM HG: CPT | Performed by: PERSONAL EMERGENCY RESPONSE ATTENDANT

## 2025-08-05 PROCEDURE — 3075F SYST BP GE 130 - 139MM HG: CPT | Performed by: PERSONAL EMERGENCY RESPONSE ATTENDANT

## 2025-08-05 PROCEDURE — 73130 X-RAY EXAM OF HAND: CPT | Mod: LEFT SIDE | Performed by: PERSONAL EMERGENCY RESPONSE ATTENDANT

## 2025-08-05 PROCEDURE — 1036F TOBACCO NON-USER: CPT | Performed by: PERSONAL EMERGENCY RESPONSE ATTENDANT

## 2025-08-05 ASSESSMENT — ENCOUNTER SYMPTOMS
CONSTITUTIONAL NEGATIVE: 1
CARDIOVASCULAR NEGATIVE: 1
RESPIRATORY NEGATIVE: 1
GASTROINTESTINAL NEGATIVE: 1
PSYCHIATRIC NEGATIVE: 1

## 2025-08-05 NOTE — PROGRESS NOTES
"Subjective   Patient ID: Mercedes Burnham is a 50 y.o. female. They present today with a chief complaint of Hand Injury (Pt. Tripped and fell on a curb today and injured left hand.).    History of Present Illness  50-year-old left-hand-dominant female who comes in today with a chief complaint of left hand pain.  Patient stated that she had a trip and fall over a curb and she fell on her left hand that was outstretched.  She has pain primarily in the hand on the ulnar aspect and into the 4th and 5th digits.  She denies striking her head or any other injury.  She states that she has pain there and that is what brought her in today.  She denies any dizziness or chest pain prior to the incident.      Hand Injury      Past Medical History  Allergies as of 08/05/2025    (No Known Allergies)       Prescriptions Prior to Admission[1]     Medical History[2]    Surgical History[3]     reports that she has never smoked. She has never used smokeless tobacco. She reports current alcohol use of about 4.0 standard drinks of alcohol per week. She reports that she does not use drugs.    Review of Systems  Review of Systems   Constitutional: Negative.    HENT: Negative.     Respiratory: Negative.     Cardiovascular: Negative.    Gastrointestinal: Negative.    Musculoskeletal:         Left hand pain.   Skin: Negative.    Psychiatric/Behavioral: Negative.     All other systems reviewed and are negative.                                 Objective    Vitals:    08/05/25 0909   BP: 133/80   BP Location: Right arm   Patient Position: Sitting   BP Cuff Size: Large adult   Pulse: 57   Resp: 18   Temp: 36.8 °C (98.2 °F)   TempSrc: Oral   SpO2: 99%   Weight: 95.7 kg (211 lb)   Height: 1.702 m (5' 7\")     No LMP recorded. Patient is premenopausal.    Physical Exam  Vitals and nursing note reviewed.   Constitutional:       Appearance: Normal appearance. She is normal weight.   HENT:      Head: Normocephalic and atraumatic.     Cardiovascular: "      Rate and Rhythm: Normal rate.   Pulmonary:      Effort: Pulmonary effort is normal.   Abdominal:      General: Abdomen is flat.   Genitourinary:     Comments: No CVA tenderness or pubic pain.    Musculoskeletal:         General: Tenderness present. Normal range of motion.      Right hand: Normal.      Left hand: Tenderness and bony tenderness present.      Comments: Patient has bony tenderness with minor soft tissue swelling noted to the 4th and 5th digits as well as the ulnar aspect of the left hand.  There is minor slight abrasions with no bleeding noted.  She has decreased range of motion to the 4th and 5th digits secondary to pain.  Sensory and perfusion are intact distal to the injury.     Skin:     General: Skin is warm and dry.     Neurological:      General: No focal deficit present.      Mental Status: She is alert and oriented to person, place, and time.     Psychiatric:         Mood and Affect: Mood normal.         Behavior: Behavior normal.         Procedures    Point of Care Test & Imaging Results from this visit  No results found for this visit on 08/05/25.   Imaging  XR hand left 3+ views  Result Date: 8/5/2025    Findings concerning for a nondisplaced fracture through the head of the 5th middle phalanx. Mild irregularity at the ulnar aspect of the wrist at the level of the triquetrum concerning for a triquetral fracture. Soft tissue edema present at the ulnar aspect of the hand. Remote appearing avulsion fracture at the medial aspect of the distal radius and at the dorsal aspect of the 2nd distal phalangeal base   MACRO: None   Signed by: Lane Quiroz 8/5/2025 10:00 AM Dictation workstation:   [id number]      Cardiology, Vascular, and Other Imaging  No other imaging results found for the past 2 days      Diagnostic study results (if any) were reviewed by Daniel Aguilar PA-C.    Assessment/Plan   Allergies, medications, history, and pertinent labs/EKGs/Imaging reviewed by Daniel Aguilar,  STEVEN.     Medical Decision Making  50-year-old female who comes in today with a chief complaint of left hand pain after trip and fall over a curb.  Patient does have some swelling and pain on palpation to the 4th and 5th digits of her left hand as well as the ulnar aspect of the hand.  She was sent for an x-ray which revealed: Findings concerning for a nondisplaced fracture through the head of  the 5th middle phalanx. Mild irregularity at the ulnar aspect of the  wrist at the level of the triquetrum concerning for a triquetral  fracture. Soft tissue edema present at the ulnar aspect of the hand.  Remote appearing avulsion fracture at the medial aspect of the distal  radius and at the dorsal aspect of the 2nd distal phalangeal base.  She was placed in an ulnar gutter splint.  She did state that she had an orthopedic doctor that she was already established with, but cannot remember his name at this point.  I did put in a referral for orthopedics for follow-up and give her information regarding the [facility] sports medicine walk-in clinic.  I advised her to take Motrin and Tylenol for symptom relief, as well as the RICE protocol.  Patient understood and agreed.  Patient stable for discharge and request to go home.  Discharge instructions were given.    Orders and Diagnoses  Diagnoses and all orders for this visit:  Closed fracture of left hand, initial encounter  -     Referral to Orthopedics and Sports Medicine; Future  Injury of left hand, initial encounter  -     XR hand left 3+ views; Future      Medical Admin Record      Patient disposition: Home    Electronically signed by Daniel Aguilar PA-C  12:15 PM           [1] (Not in a hospital admission)  [2]   Past Medical History:  Diagnosis Date    Arthritis Several years ago from orthopedist. In my knees.    Bitten or stung by nonvenomous insect and other nonvenomous arthropods, initial encounter 07/28/2017    Insect bite, multiple    BRCA2 gene mutation positive      Bunion Early childhood    Other conditions influencing health status 2017    History of cough    Personal history of other mental and behavioral disorders     History of depression   [3]   Past Surgical History:  Procedure Laterality Date    BUNIONECTOMY  ?     SECTION, CLASSIC  2015     Section    FOOT SURGERY  2015    Foot Repair    KNEE ARTHROPLASTY  ?    OTHER SURGICAL HISTORY  2019    Odin-en-Y gastric bypass    OTHER SURGICAL HISTORY  2019    Knee arthroscopy

## 2025-08-06 ENCOUNTER — APPOINTMENT (OUTPATIENT)
Dept: SPORTS MEDICINE | Facility: HOSPITAL | Age: 50
End: 2025-08-06
Payer: COMMERCIAL

## 2025-08-07 ENCOUNTER — OFFICE VISIT (OUTPATIENT)
Dept: ORTHOPEDIC SURGERY | Facility: CLINIC | Age: 50
End: 2025-08-07
Payer: COMMERCIAL

## 2025-08-07 VITALS — WEIGHT: 211 LBS | BODY MASS INDEX: 33.12 KG/M2 | HEIGHT: 67 IN

## 2025-08-07 DIAGNOSIS — M79.642 HAND PAIN, LEFT: ICD-10-CM

## 2025-08-07 PROCEDURE — 99214 OFFICE O/P EST MOD 30 MIN: CPT | Performed by: STUDENT IN AN ORGANIZED HEALTH CARE EDUCATION/TRAINING PROGRAM

## 2025-08-07 PROCEDURE — 3008F BODY MASS INDEX DOCD: CPT | Performed by: STUDENT IN AN ORGANIZED HEALTH CARE EDUCATION/TRAINING PROGRAM

## 2025-08-07 PROCEDURE — 99213 OFFICE O/P EST LOW 20 MIN: CPT | Performed by: STUDENT IN AN ORGANIZED HEALTH CARE EDUCATION/TRAINING PROGRAM

## 2025-08-07 PROCEDURE — L3809 WHFO W/O JOINTS PRE OTS: HCPCS | Performed by: STUDENT IN AN ORGANIZED HEALTH CARE EDUCATION/TRAINING PROGRAM

## 2025-08-07 PROCEDURE — 1036F TOBACCO NON-USER: CPT | Performed by: STUDENT IN AN ORGANIZED HEALTH CARE EDUCATION/TRAINING PROGRAM

## 2025-08-07 NOTE — PROGRESS NOTES
CHIEF COMPLAINT: L hand pain  DOI:8/5/2025  DOS:None      HISTORY OF PRESENT ILLNESS    50yF, left-hand-dominant, works as a food prepare at [employer], lives with other adults, denies active tobacco use denies diabetes denies anticoagulation use denies prior surgeries to her hands.  On 8/5/2025 she sustained mechanical ground-level fall with a FOOSH type mechanism landing on the ulnar aspect of her left hand.  She notes some discomfort and then throughout the day her swelling increased and she is having difficulty performing her job duties.  Denies any open wounds at the time of injury denies any numbness tingling paresthesias.  She is seen at the injury clinic where radiographs demonstrated possible small finger P2 head fracture and possible left-sided dorsal triquetral fracture.  She was placed in a ulnar gutter Orthoplast splint and directed to follow-up.    PHYSICAL EXAM    Extremities / Musculoskeletal:                  Left upper extremity:  Splint removed.  No gross deformity no active skin lesions no open wounds.  Subtle fusiform edema about the small finger PIP.  Normal resting cascade no static or dynamic scissoring.  FDS FDP intact to all digits.  FPL EPL intact.  Focally tender palpation about the small finger PIP.  Nontender palpation about the small finger DIP and dorsal triquetrum.  Full active wrist range of motion full active forearm range of motion.  Negative Dennis test at the small finger.  Stable to coronal stressing at 0 and 30 degrees of flexion  Motor intact to radian/PIN/median/AIN/Ulnar nerve distributions. Sensation intact to light touch in the median/ulnar/radial nerve distributions. 2+ radial pulse at the wrist, hand and all digits are warm and well-perfused with a brisk capillary refill of <2s.       HgA1c:   Lab Results   Component Value Date    HGBA1C 5.2 08/02/2023    LNGIZOOT0M 103 08/02/2023       IMAGING / LABS / EMGs:    Left hand x-ray series obtained on 8/5/2025 independently  reviewed demonstrating generally stable global alignment small linear lucency seen at the small finger P2 head otherwise no abnormal findings appreciated    Medical History[1]    Medication Documentation Review Audit       Reviewed by Mirna Godinez LPN (Licensed Nurse) on 08/07/25 at 1435      Medication Order Taking? Sig Documenting Provider Last Dose Status   busPIRone (Buspar) 10 mg tablet [id number]  Take 1 tablet (10 mg) by mouth 3 times a day. Cata Jaime DO  Active   oxyBUTYnin XL (Ditropan-XL) 15 mg 24 hr tablet [id number]  Take 1 tablet (15 mg) by mouth once daily. Do not crush, chew, or split. Cata Jaime DO  Active   propranolol (Inderal) 20 mg tablet [id number]  Take 1 tablet (20 mg) by mouth 2 times a day. Please schedule appointment for further refills Cata Jaime DO  Active   tirzepatide (MOUNJARO SUBQ) [id number]  Inject under the skin. Historical Provider, MD  Active   venlafaxine 225 mg 24 hr tablet [id number]  Take 1 tablet (225 mg) by mouth once daily. Do not crush, chew, or split. Cata Jaime DO  Active                    RX Allergies[2]    Surgical History[3]      ASSESSMENT:   L SF PIP injury, likely capsular sprain versus occult fx    We will conservatively immobilize with a contender brace. I recommend coming out of the brace daily for gentle ROM, no strengthening.     XR read of possible triquetral fx and SF P2 head fx do not correlate with any pain or concerning findings.       Follow-up in: 2 weeks  XR at next visit: left hand XR series    The diagnosis and treatment plan were reviewed with the patient. All questions were answered. The patient verbalized understanding of the treatment plan. There were no barriers to understanding identified.     Note dictated with RedCap software.  Completed without full type editing and sent to avoid delay.    Lam Edmond M.D.    Department of Orthopaedics  Hand/Upper Extremity  Phone: [phone]  Appt. Phone: [phone]         [1]   Past Medical History:  Diagnosis Date    Arthritis Several years ago from orthopedist. In my knees.    Bitten or stung by nonvenomous insect and other nonvenomous arthropods, initial encounter 2017    Insect bite, multiple    BRCA2 gene mutation positive     Bunion Early childhood    Other conditions influencing health status 2017    History of cough    Personal history of other mental and behavioral disorders     History of depression   [2] No Known Allergies  [3]   Past Surgical History:  Procedure Laterality Date    BUNIONECTOMY  ?     SECTION, CLASSIC  2015     Section    FOOT SURGERY  2015    Foot Repair    KNEE ARTHROPLASTY  ?    OTHER SURGICAL HISTORY  2019    Odin-en-Y gastric bypass    OTHER SURGICAL HISTORY  2019    Knee arthroscopy

## 2025-08-21 ENCOUNTER — APPOINTMENT (OUTPATIENT)
Dept: ORTHOPEDIC SURGERY | Facility: CLINIC | Age: 50
End: 2025-08-21
Payer: COMMERCIAL

## 2025-09-05 ENCOUNTER — APPOINTMENT (OUTPATIENT)
Dept: RADIOLOGY | Facility: CLINIC | Age: 50
End: 2025-09-05
Payer: COMMERCIAL

## 2025-09-05 ENCOUNTER — APPOINTMENT (OUTPATIENT)
Dept: ORTHOPEDIC SURGERY | Facility: CLINIC | Age: 50
End: 2025-09-05
Payer: COMMERCIAL

## 2025-09-05 DIAGNOSIS — M79.642 HAND PAIN, LEFT: ICD-10-CM

## 2025-09-15 ENCOUNTER — APPOINTMENT (OUTPATIENT)
Dept: OBSTETRICS AND GYNECOLOGY | Facility: CLINIC | Age: 50
End: 2025-09-15
Payer: COMMERCIAL

## 2025-12-22 ENCOUNTER — APPOINTMENT (OUTPATIENT)
Dept: PRIMARY CARE | Facility: CLINIC | Age: 50
End: 2025-12-22
Payer: COMMERCIAL